# Patient Record
Sex: FEMALE | Race: WHITE | NOT HISPANIC OR LATINO | Employment: UNEMPLOYED | URBAN - METROPOLITAN AREA
[De-identification: names, ages, dates, MRNs, and addresses within clinical notes are randomized per-mention and may not be internally consistent; named-entity substitution may affect disease eponyms.]

---

## 2021-04-16 ENCOUNTER — APPOINTMENT (OUTPATIENT)
Dept: CARDIOLOGY | Facility: HOSPITAL | Age: 36
End: 2021-04-16

## 2021-04-16 ENCOUNTER — APPOINTMENT (OUTPATIENT)
Dept: ULTRASOUND IMAGING | Facility: HOSPITAL | Age: 36
End: 2021-04-16

## 2021-04-16 ENCOUNTER — APPOINTMENT (OUTPATIENT)
Dept: CT IMAGING | Facility: HOSPITAL | Age: 36
End: 2021-04-16

## 2021-04-16 ENCOUNTER — HOSPITAL ENCOUNTER (INPATIENT)
Facility: HOSPITAL | Age: 36
LOS: 3 days | Discharge: HOME | End: 2021-04-22
Attending: STUDENT IN AN ORGANIZED HEALTH CARE EDUCATION/TRAINING PROGRAM | Admitting: STUDENT IN AN ORGANIZED HEALTH CARE EDUCATION/TRAINING PROGRAM

## 2021-04-16 DIAGNOSIS — I50.811 ACUTE RIGHT-SIDED CONGESTIVE HEART FAILURE (HCC): Primary | ICD-10-CM

## 2021-04-16 DIAGNOSIS — R06.09 DYSPNEA ON EXERTION: ICD-10-CM

## 2021-04-16 DIAGNOSIS — I10 HYPERTENSION, UNSPECIFIED TYPE: ICD-10-CM

## 2021-04-16 PROBLEM — Z72.0 TOBACCO ABUSE: Chronic | Status: ACTIVE | Noted: 2021-04-16

## 2021-04-16 PROBLEM — E78.5 HLD (HYPERLIPIDEMIA): Chronic | Status: ACTIVE | Noted: 2021-04-16

## 2021-04-16 PROBLEM — D64.9 ANEMIA: Status: ACTIVE | Noted: 2021-04-16

## 2021-04-16 PROBLEM — E66.01 MORBID OBESITY (HCC): Chronic | Status: ACTIVE | Noted: 2021-04-16

## 2021-04-16 LAB
ALBUMIN SERPL-MCNC: 3.5 G/DL (ref 3.5–5.2)
ALBUMIN/GLOB SERPL: 1.3 G/DL
ALP SERPL-CCNC: 72 U/L (ref 39–117)
ALT SERPL W P-5'-P-CCNC: 21 U/L (ref 1–33)
AMMONIA BLD-SCNC: 25 UMOL/L (ref 11–51)
AMPHET+METHAMPHET UR QL: NEGATIVE
ANION GAP SERPL CALCULATED.3IONS-SCNC: 9 MMOL/L (ref 5–15)
AST SERPL-CCNC: 13 U/L (ref 1–32)
B-HCG UR QL: NEGATIVE
BARBITURATES UR QL SCN: NEGATIVE
BASOPHILS # BLD AUTO: 0.1 10*3/MM3 (ref 0–0.2)
BASOPHILS NFR BLD AUTO: 1 % (ref 0–1.5)
BENZODIAZ UR QL SCN: NEGATIVE
BILIRUB SERPL-MCNC: 0.4 MG/DL (ref 0–1.2)
BILIRUB UR QL STRIP: NEGATIVE
BUN SERPL-MCNC: 26 MG/DL (ref 6–20)
BUN/CREAT SERPL: 25 (ref 7–25)
CALCIUM SPEC-SCNC: 9 MG/DL (ref 8.6–10.5)
CANNABINOIDS SERPL QL: NEGATIVE
CHLORIDE SERPL-SCNC: 103 MMOL/L (ref 98–107)
CLARITY UR: CLEAR
CO2 SERPL-SCNC: 26 MMOL/L (ref 22–29)
COCAINE UR QL: NEGATIVE
COLOR UR: YELLOW
CREAT SERPL-MCNC: 1.04 MG/DL (ref 0.57–1)
D DIMER PPP FEU-MCNC: 3.92 MG/L (FEU) (ref 0–0.59)
DEPRECATED RDW RBC AUTO: 42.9 FL (ref 37–54)
EOSINOPHIL # BLD AUTO: 0.3 10*3/MM3 (ref 0–0.4)
EOSINOPHIL NFR BLD AUTO: 3.7 % (ref 0.3–6.2)
ERYTHROCYTE [DISTWIDTH] IN BLOOD BY AUTOMATED COUNT: 16.3 % (ref 12.3–15.4)
FERRITIN SERPL-MCNC: 86.35 NG/ML (ref 13–150)
FOLATE SERPL-MCNC: 13.7 NG/ML (ref 4.78–24.2)
GFR SERPL CREATININE-BSD FRML MDRD: 60 ML/MIN/1.73
GLOBULIN UR ELPH-MCNC: 2.7 GM/DL
GLUCOSE SERPL-MCNC: 113 MG/DL (ref 65–99)
GLUCOSE UR STRIP-MCNC: NEGATIVE MG/DL
HBA1C MFR BLD: 6.3 % (ref 3.5–5.6)
HCT VFR BLD AUTO: 33.3 % (ref 34–46.6)
HGB BLD-MCNC: 10.9 G/DL (ref 12–15.9)
HGB UR QL STRIP.AUTO: NEGATIVE
INR PPP: 1.07 (ref 0.93–1.1)
IRON 24H UR-MRATE: 25 MCG/DL (ref 37–145)
KETONES UR QL STRIP: NEGATIVE
LEUKOCYTE ESTERASE UR QL STRIP.AUTO: NEGATIVE
LYMPHOCYTES # BLD AUTO: 1.1 10*3/MM3 (ref 0.7–3.1)
LYMPHOCYTES NFR BLD AUTO: 12.4 % (ref 19.6–45.3)
MCH RBC QN AUTO: 24.2 PG (ref 26.6–33)
MCHC RBC AUTO-ENTMCNC: 32.7 G/DL (ref 31.5–35.7)
MCV RBC AUTO: 73.8 FL (ref 79–97)
METHADONE UR QL SCN: NEGATIVE
MONOCYTES # BLD AUTO: 0.7 10*3/MM3 (ref 0.1–0.9)
MONOCYTES NFR BLD AUTO: 8.1 % (ref 5–12)
NEUTROPHILS NFR BLD AUTO: 6.8 10*3/MM3 (ref 1.7–7)
NEUTROPHILS NFR BLD AUTO: 74.8 % (ref 42.7–76)
NITRITE UR QL STRIP: NEGATIVE
NRBC BLD AUTO-RTO: 0 /100 WBC (ref 0–0.2)
NT-PROBNP SERPL-MCNC: 2177 PG/ML (ref 0–450)
OPIATES UR QL: NEGATIVE
OXYCODONE UR QL SCN: NEGATIVE
PH UR STRIP.AUTO: 6 [PH] (ref 5–8)
PLATELET # BLD AUTO: 320 10*3/MM3 (ref 140–450)
PMV BLD AUTO: 8.7 FL (ref 6–12)
POTASSIUM SERPL-SCNC: 4 MMOL/L (ref 3.5–5.2)
PROT SERPL-MCNC: 6.2 G/DL (ref 6–8.5)
PROT UR QL STRIP: NEGATIVE
PROTHROMBIN TIME: 11.7 SECONDS (ref 9.6–11.7)
QT INTERVAL: 453 MS
RBC # BLD AUTO: 4.51 10*6/MM3 (ref 3.77–5.28)
SARS-COV-2 RNA PNL SPEC NAA+PROBE: NORMAL
SODIUM SERPL-SCNC: 138 MMOL/L (ref 136–145)
SP GR UR STRIP: 1.03 (ref 1–1.03)
T3FREE SERPL-MCNC: 2.5 PG/ML (ref 2–4.4)
T4 FREE SERPL-MCNC: 1.36 NG/DL (ref 0.93–1.7)
TROPONIN T SERPL-MCNC: <0.01 NG/ML (ref 0–0.03)
TSH SERPL DL<=0.05 MIU/L-ACNC: 4.73 UIU/ML (ref 0.27–4.2)
TSH SERPL DL<=0.05 MIU/L-ACNC: 5.08 UIU/ML (ref 0.27–4.2)
UROBILINOGEN UR QL STRIP: ABNORMAL
VIT B12 BLD-MCNC: 224 PG/ML (ref 211–946)
WBC # BLD AUTO: 9.1 10*3/MM3 (ref 3.4–10.8)

## 2021-04-16 PROCEDURE — 80307 DRUG TEST PRSMV CHEM ANLYZR: CPT | Performed by: NURSE PRACTITIONER

## 2021-04-16 PROCEDURE — 85610 PROTHROMBIN TIME: CPT | Performed by: NURSE PRACTITIONER

## 2021-04-16 PROCEDURE — 84443 ASSAY THYROID STIM HORMONE: CPT | Performed by: NURSE PRACTITIONER

## 2021-04-16 PROCEDURE — 80053 COMPREHEN METABOLIC PANEL: CPT | Performed by: NURSE PRACTITIONER

## 2021-04-16 PROCEDURE — 83036 HEMOGLOBIN GLYCOSYLATED A1C: CPT | Performed by: NURSE PRACTITIONER

## 2021-04-16 PROCEDURE — G0378 HOSPITAL OBSERVATION PER HR: HCPCS

## 2021-04-16 PROCEDURE — 25010000002 FUROSEMIDE PER 20 MG: Performed by: INTERNAL MEDICINE

## 2021-04-16 PROCEDURE — 93306 TTE W/DOPPLER COMPLETE: CPT

## 2021-04-16 PROCEDURE — 84484 ASSAY OF TROPONIN QUANT: CPT | Performed by: NURSE PRACTITIONER

## 2021-04-16 PROCEDURE — 81003 URINALYSIS AUTO W/O SCOPE: CPT | Performed by: INTERNAL MEDICINE

## 2021-04-16 PROCEDURE — 83880 ASSAY OF NATRIURETIC PEPTIDE: CPT | Performed by: NURSE PRACTITIONER

## 2021-04-16 PROCEDURE — 99219 PR INITIAL OBSERVATION CARE/DAY 50 MINUTES: CPT | Performed by: NURSE PRACTITIONER

## 2021-04-16 PROCEDURE — 82746 ASSAY OF FOLIC ACID SERUM: CPT | Performed by: NURSE PRACTITIONER

## 2021-04-16 PROCEDURE — 82607 VITAMIN B-12: CPT | Performed by: NURSE PRACTITIONER

## 2021-04-16 PROCEDURE — 76775 US EXAM ABDO BACK WALL LIM: CPT

## 2021-04-16 PROCEDURE — 83540 ASSAY OF IRON: CPT | Performed by: NURSE PRACTITIONER

## 2021-04-16 PROCEDURE — 93306 TTE W/DOPPLER COMPLETE: CPT | Performed by: INTERNAL MEDICINE

## 2021-04-16 PROCEDURE — 82728 ASSAY OF FERRITIN: CPT | Performed by: NURSE PRACTITIONER

## 2021-04-16 PROCEDURE — 93005 ELECTROCARDIOGRAM TRACING: CPT | Performed by: STUDENT IN AN ORGANIZED HEALTH CARE EDUCATION/TRAINING PROGRAM

## 2021-04-16 PROCEDURE — 36415 COLL VENOUS BLD VENIPUNCTURE: CPT

## 2021-04-16 PROCEDURE — 82088 ASSAY OF ALDOSTERONE: CPT | Performed by: INTERNAL MEDICINE

## 2021-04-16 PROCEDURE — 85379 FIBRIN DEGRADATION QUANT: CPT | Performed by: NURSE PRACTITIONER

## 2021-04-16 PROCEDURE — 0 IOPAMIDOL PER 1 ML: Performed by: NURSE PRACTITIONER

## 2021-04-16 PROCEDURE — 99284 EMERGENCY DEPT VISIT MOD MDM: CPT

## 2021-04-16 PROCEDURE — 81025 URINE PREGNANCY TEST: CPT | Performed by: INTERNAL MEDICINE

## 2021-04-16 PROCEDURE — 82140 ASSAY OF AMMONIA: CPT | Performed by: NURSE PRACTITIONER

## 2021-04-16 PROCEDURE — 25010000002 SULFUR HEXAFLUORIDE MICROSPH 60.7-25 MG RECONSTITUTED SUSPENSION: Performed by: STUDENT IN AN ORGANIZED HEALTH CARE EDUCATION/TRAINING PROGRAM

## 2021-04-16 PROCEDURE — 84481 FREE ASSAY (FT-3): CPT | Performed by: STUDENT IN AN ORGANIZED HEALTH CARE EDUCATION/TRAINING PROGRAM

## 2021-04-16 PROCEDURE — 84244 ASSAY OF RENIN: CPT | Performed by: INTERNAL MEDICINE

## 2021-04-16 PROCEDURE — 93005 ELECTROCARDIOGRAM TRACING: CPT

## 2021-04-16 PROCEDURE — 87635 SARS-COV-2 COVID-19 AMP PRB: CPT | Performed by: NURSE PRACTITIONER

## 2021-04-16 PROCEDURE — 84439 ASSAY OF FREE THYROXINE: CPT | Performed by: STUDENT IN AN ORGANIZED HEALTH CARE EDUCATION/TRAINING PROGRAM

## 2021-04-16 PROCEDURE — 84156 ASSAY OF PROTEIN URINE: CPT | Performed by: INTERNAL MEDICINE

## 2021-04-16 PROCEDURE — 71275 CT ANGIOGRAPHY CHEST: CPT

## 2021-04-16 PROCEDURE — 25010000002 ENOXAPARIN PER 10 MG: Performed by: NURSE PRACTITIONER

## 2021-04-16 PROCEDURE — 85025 COMPLETE CBC W/AUTO DIFF WBC: CPT | Performed by: NURSE PRACTITIONER

## 2021-04-16 PROCEDURE — 82570 ASSAY OF URINE CREATININE: CPT | Performed by: INTERNAL MEDICINE

## 2021-04-16 RX ORDER — FUROSEMIDE 10 MG/ML
40 INJECTION INTRAMUSCULAR; INTRAVENOUS EVERY 12 HOURS
Status: DISCONTINUED | OUTPATIENT
Start: 2021-04-16 | End: 2021-04-18

## 2021-04-16 RX ORDER — CHOLECALCIFEROL (VITAMIN D3) 125 MCG
10 CAPSULE ORAL NIGHTLY
COMMUNITY
End: 2021-04-28

## 2021-04-16 RX ORDER — MAGNESIUM SULFATE 1 G/100ML
1 INJECTION INTRAVENOUS AS NEEDED
Status: DISCONTINUED | OUTPATIENT
Start: 2021-04-16 | End: 2021-04-22 | Stop reason: HOSPADM

## 2021-04-16 RX ORDER — ASPIRIN 325 MG
325 TABLET ORAL DAILY
Status: DISCONTINUED | OUTPATIENT
Start: 2021-04-17 | End: 2021-04-22 | Stop reason: HOSPADM

## 2021-04-16 RX ORDER — ONDANSETRON 4 MG/1
4 TABLET, FILM COATED ORAL EVERY 6 HOURS PRN
Status: DISCONTINUED | OUTPATIENT
Start: 2021-04-16 | End: 2021-04-22 | Stop reason: HOSPADM

## 2021-04-16 RX ORDER — HYDROXYZINE HYDROCHLORIDE 25 MG/1
50 TABLET, FILM COATED ORAL 3 TIMES DAILY PRN
Status: DISCONTINUED | OUTPATIENT
Start: 2021-04-16 | End: 2021-04-22 | Stop reason: HOSPADM

## 2021-04-16 RX ORDER — LORAZEPAM 2 MG/1
2 TABLET ORAL EVERY 8 HOURS PRN
COMMUNITY
End: 2021-04-28

## 2021-04-16 RX ORDER — OMEGA-3S/DHA/EPA/FISH OIL/D3 300MG-1000
400 CAPSULE ORAL DAILY
COMMUNITY
End: 2021-05-17

## 2021-04-16 RX ORDER — POTASSIUM CHLORIDE 20 MEQ/1
40 TABLET, EXTENDED RELEASE ORAL AS NEEDED
Status: DISCONTINUED | OUTPATIENT
Start: 2021-04-16 | End: 2021-04-22 | Stop reason: HOSPADM

## 2021-04-16 RX ORDER — HYDRALAZINE HYDROCHLORIDE 25 MG/1
25 TABLET, FILM COATED ORAL EVERY 8 HOURS SCHEDULED
Status: DISCONTINUED | OUTPATIENT
Start: 2021-04-16 | End: 2021-04-17

## 2021-04-16 RX ORDER — NICOTINE 21 MG/24HR
1 PATCH, TRANSDERMAL 24 HOURS TRANSDERMAL
Status: DISCONTINUED | OUTPATIENT
Start: 2021-04-16 | End: 2021-04-18

## 2021-04-16 RX ORDER — LISINOPRIL 20 MG/1
20 TABLET ORAL DAILY
COMMUNITY
End: 2021-04-22 | Stop reason: HOSPADM

## 2021-04-16 RX ORDER — ACETAMINOPHEN 325 MG/1
650 TABLET ORAL EVERY 4 HOURS PRN
Status: DISCONTINUED | OUTPATIENT
Start: 2021-04-16 | End: 2021-04-22 | Stop reason: HOSPADM

## 2021-04-16 RX ORDER — SPIRONOLACTONE 25 MG/1
25 TABLET ORAL DAILY
Status: DISCONTINUED | OUTPATIENT
Start: 2021-04-17 | End: 2021-04-17

## 2021-04-16 RX ORDER — CARVEDILOL 6.25 MG/1
12.5 TABLET ORAL 2 TIMES DAILY WITH MEALS
Status: DISCONTINUED | OUTPATIENT
Start: 2021-04-16 | End: 2021-04-19

## 2021-04-16 RX ORDER — FUROSEMIDE 10 MG/ML
40 INJECTION INTRAMUSCULAR; INTRAVENOUS DAILY
Status: DISCONTINUED | OUTPATIENT
Start: 2021-04-16 | End: 2021-04-16

## 2021-04-16 RX ORDER — MAGNESIUM SULFATE HEPTAHYDRATE 40 MG/ML
2 INJECTION, SOLUTION INTRAVENOUS AS NEEDED
Status: DISCONTINUED | OUTPATIENT
Start: 2021-04-16 | End: 2021-04-22 | Stop reason: HOSPADM

## 2021-04-16 RX ORDER — AMLODIPINE BESYLATE 5 MG/1
5 TABLET ORAL
Status: DISCONTINUED | OUTPATIENT
Start: 2021-04-16 | End: 2021-04-20

## 2021-04-16 RX ORDER — ACETAMINOPHEN 160 MG/5ML
650 SOLUTION ORAL EVERY 4 HOURS PRN
Status: DISCONTINUED | OUTPATIENT
Start: 2021-04-16 | End: 2021-04-22 | Stop reason: HOSPADM

## 2021-04-16 RX ORDER — SODIUM CHLORIDE 0.9 % (FLUSH) 0.9 %
10 SYRINGE (ML) INJECTION EVERY 12 HOURS SCHEDULED
Status: DISCONTINUED | OUTPATIENT
Start: 2021-04-16 | End: 2021-04-22 | Stop reason: HOSPADM

## 2021-04-16 RX ORDER — ATORVASTATIN CALCIUM 20 MG/1
20 TABLET, FILM COATED ORAL NIGHTLY
Status: DISCONTINUED | OUTPATIENT
Start: 2021-04-16 | End: 2021-04-22 | Stop reason: HOSPADM

## 2021-04-16 RX ORDER — CARVEDILOL 6.25 MG/1
12.5 TABLET ORAL 2 TIMES DAILY WITH MEALS
Status: DISCONTINUED | OUTPATIENT
Start: 2021-04-16 | End: 2021-04-16 | Stop reason: SDUPTHER

## 2021-04-16 RX ORDER — SODIUM CHLORIDE 0.9 % (FLUSH) 0.9 %
10 SYRINGE (ML) INJECTION AS NEEDED
Status: DISCONTINUED | OUTPATIENT
Start: 2021-04-16 | End: 2021-04-22 | Stop reason: HOSPADM

## 2021-04-16 RX ORDER — CARVEDILOL 12.5 MG/1
12.5 TABLET ORAL 2 TIMES DAILY WITH MEALS
COMMUNITY
End: 2021-04-22 | Stop reason: HOSPADM

## 2021-04-16 RX ORDER — ATORVASTATIN CALCIUM 20 MG/1
20 TABLET, FILM COATED ORAL NIGHTLY
COMMUNITY
End: 2021-04-28

## 2021-04-16 RX ORDER — ONDANSETRON 2 MG/ML
4 INJECTION INTRAMUSCULAR; INTRAVENOUS EVERY 6 HOURS PRN
Status: DISCONTINUED | OUTPATIENT
Start: 2021-04-16 | End: 2021-04-22 | Stop reason: HOSPADM

## 2021-04-16 RX ORDER — HYDRALAZINE HYDROCHLORIDE 20 MG/ML
10 INJECTION INTRAMUSCULAR; INTRAVENOUS EVERY 6 HOURS PRN
Status: DISCONTINUED | OUTPATIENT
Start: 2021-04-16 | End: 2021-04-22 | Stop reason: HOSPADM

## 2021-04-16 RX ORDER — ASPIRIN 325 MG
325 TABLET ORAL DAILY
COMMUNITY
End: 2021-04-28

## 2021-04-16 RX ORDER — SPIRONOLACTONE 25 MG/1
25 TABLET ORAL DAILY
COMMUNITY
End: 2021-04-22 | Stop reason: HOSPADM

## 2021-04-16 RX ORDER — LABETALOL HYDROCHLORIDE 5 MG/ML
10 INJECTION, SOLUTION INTRAVENOUS
Status: DISCONTINUED | OUTPATIENT
Start: 2021-04-16 | End: 2021-04-22 | Stop reason: HOSPADM

## 2021-04-16 RX ORDER — ACETAMINOPHEN 650 MG/1
650 SUPPOSITORY RECTAL EVERY 4 HOURS PRN
Status: DISCONTINUED | OUTPATIENT
Start: 2021-04-16 | End: 2021-04-22 | Stop reason: HOSPADM

## 2021-04-16 RX ORDER — FUROSEMIDE 40 MG/1
40 TABLET ORAL DAILY
COMMUNITY
End: 2021-04-22 | Stop reason: HOSPADM

## 2021-04-16 RX ORDER — LISINOPRIL 20 MG/1
20 TABLET ORAL DAILY
Status: DISCONTINUED | OUTPATIENT
Start: 2021-04-17 | End: 2021-04-16

## 2021-04-16 RX ORDER — CLONIDINE HYDROCHLORIDE 0.1 MG/1
0.1 TABLET ORAL ONCE
Status: COMPLETED | OUTPATIENT
Start: 2021-04-16 | End: 2021-04-16

## 2021-04-16 RX ORDER — FUROSEMIDE 10 MG/ML
40 INJECTION INTRAMUSCULAR; INTRAVENOUS EVERY 12 HOURS
Status: DISCONTINUED | OUTPATIENT
Start: 2021-04-16 | End: 2021-04-16 | Stop reason: SDUPTHER

## 2021-04-16 RX ADMIN — ATORVASTATIN CALCIUM 20 MG: 20 TABLET, FILM COATED ORAL at 20:30

## 2021-04-16 RX ADMIN — FUROSEMIDE 40 MG: 10 INJECTION, SOLUTION INTRAMUSCULAR; INTRAVENOUS at 17:03

## 2021-04-16 RX ADMIN — ENOXAPARIN SODIUM 40 MG: 40 INJECTION SUBCUTANEOUS at 15:51

## 2021-04-16 RX ADMIN — CLONIDINE HYDROCHLORIDE 0.1 MG: 0.1 TABLET ORAL at 10:53

## 2021-04-16 RX ADMIN — HYDRALAZINE HYDROCHLORIDE 25 MG: 25 TABLET, FILM COATED ORAL at 15:51

## 2021-04-16 RX ADMIN — AMLODIPINE BESYLATE 5 MG: 5 TABLET ORAL at 15:50

## 2021-04-16 RX ADMIN — SULFUR HEXAFLUORIDE 1 ML: KIT at 16:41

## 2021-04-16 RX ADMIN — Medication 10 ML: at 20:30

## 2021-04-16 RX ADMIN — IOPAMIDOL 100 ML: 755 INJECTION, SOLUTION INTRAVENOUS at 12:14

## 2021-04-16 RX ADMIN — HYDROXYZINE HYDROCHLORIDE 50 MG: 25 TABLET, FILM COATED ORAL at 20:30

## 2021-04-16 RX ADMIN — HYDRALAZINE HYDROCHLORIDE 25 MG: 25 TABLET, FILM COATED ORAL at 21:42

## 2021-04-16 RX ADMIN — CARVEDILOL 12.5 MG: 6.25 TABLET, FILM COATED ORAL at 17:02

## 2021-04-17 ENCOUNTER — APPOINTMENT (OUTPATIENT)
Dept: CARDIOLOGY | Facility: HOSPITAL | Age: 36
End: 2021-04-17

## 2021-04-17 LAB
ALBUMIN SERPL-MCNC: 3.6 G/DL (ref 3.5–5.2)
ALBUMIN/GLOB SERPL: 1.2 G/DL
ALP SERPL-CCNC: 72 U/L (ref 39–117)
ALT SERPL W P-5'-P-CCNC: 20 U/L (ref 1–33)
ANION GAP SERPL CALCULATED.3IONS-SCNC: 12 MMOL/L (ref 5–15)
AST SERPL-CCNC: 14 U/L (ref 1–32)
BASOPHILS # BLD AUTO: 0.1 10*3/MM3 (ref 0–0.2)
BASOPHILS NFR BLD AUTO: 1.5 % (ref 0–1.5)
BH CV ECHO MEAS - DIST REN A PSV LEFT: 50 CM/S
BH CV ECHO MEAS - MID REN A PSV LEFT: 65 CM/S
BH CV ECHO MEAS - PROX REN A PSV LEFT: 61 CM/S
BH CV VAS RENAL AORTIC MID PSV: 90 CM/S
BH CV XLRA MEAS - KID L LEFT: 11.7 CM
BH CV XLRA MEAS DIST REN A PSV RIGHT: 71 CM/S
BH CV XLRA MEAS KID L RIGHT: 11.7 CM
BH CV XLRA MEAS KID W RIGHT: 5.8 CM
BH CV XLRA MEAS MID REN A PSV RIGHT: 58 CM/S
BH CV XLRA MEAS PROX REN A PSV RIGHT: 58 CM/S
BH CV XLRA MEAS RAR LEFT: 0.72
BH CV XLRA MEAS RAR RIGHT: 0.79
BILIRUB SERPL-MCNC: 0.4 MG/DL (ref 0–1.2)
BUN SERPL-MCNC: 19 MG/DL (ref 6–20)
BUN/CREAT SERPL: 19 (ref 7–25)
CALCIUM SPEC-SCNC: 9.3 MG/DL (ref 8.6–10.5)
CHLORIDE SERPL-SCNC: 99 MMOL/L (ref 98–107)
CHOLEST SERPL-MCNC: 111 MG/DL (ref 0–200)
CK SERPL-CCNC: 36 U/L (ref 20–180)
CO2 SERPL-SCNC: 29 MMOL/L (ref 22–29)
CREAT SERPL-MCNC: 1 MG/DL (ref 0.57–1)
CREAT UR-MCNC: 62.1 MG/DL
DEPRECATED RDW RBC AUTO: 43.8 FL (ref 37–54)
EOSINOPHIL # BLD AUTO: 0.4 10*3/MM3 (ref 0–0.4)
EOSINOPHIL NFR BLD AUTO: 4 % (ref 0.3–6.2)
ERYTHROCYTE [DISTWIDTH] IN BLOOD BY AUTOMATED COUNT: 16.6 % (ref 12.3–15.4)
FERRITIN SERPL-MCNC: 349.1 NG/ML (ref 13–150)
GFR SERPL CREATININE-BSD FRML MDRD: 63 ML/MIN/1.73
GLOBULIN UR ELPH-MCNC: 2.9 GM/DL
GLUCOSE SERPL-MCNC: 146 MG/DL (ref 65–99)
HCT VFR BLD AUTO: 35.2 % (ref 34–46.6)
HDLC SERPL-MCNC: 24 MG/DL (ref 40–60)
HGB BLD-MCNC: 11.6 G/DL (ref 12–15.9)
IRON 24H UR-MRATE: 24 MCG/DL (ref 37–145)
IRON SATN MFR SERPL: 6 % (ref 20–50)
LDLC SERPL CALC-MCNC: 67 MG/DL (ref 0–100)
LDLC/HDLC SERPL: 2.72 {RATIO}
LEFT KIDNEY WIDTH: 5 CM
LYMPHOCYTES # BLD AUTO: 1.2 10*3/MM3 (ref 0.7–3.1)
LYMPHOCYTES NFR BLD AUTO: 12 % (ref 19.6–45.3)
MAGNESIUM SERPL-MCNC: 1.8 MG/DL (ref 1.6–2.6)
MCH RBC QN AUTO: 24.2 PG (ref 26.6–33)
MCHC RBC AUTO-ENTMCNC: 32.9 G/DL (ref 31.5–35.7)
MCV RBC AUTO: 73.4 FL (ref 79–97)
MONOCYTES # BLD AUTO: 0.8 10*3/MM3 (ref 0.1–0.9)
MONOCYTES NFR BLD AUTO: 8.6 % (ref 5–12)
NEUTROPHILS NFR BLD AUTO: 7.3 10*3/MM3 (ref 1.7–7)
NEUTROPHILS NFR BLD AUTO: 73.9 % (ref 42.7–76)
NRBC BLD AUTO-RTO: 0 /100 WBC (ref 0–0.2)
NT-PROBNP SERPL-MCNC: 1670 PG/ML (ref 0–450)
PHOSPHATE SERPL-MCNC: 2.7 MG/DL (ref 2.5–4.5)
PLATELET # BLD AUTO: 322 10*3/MM3 (ref 140–450)
PMV BLD AUTO: 8.9 FL (ref 6–12)
POTASSIUM SERPL-SCNC: 3.3 MMOL/L (ref 3.5–5.2)
PROT SERPL-MCNC: 6.5 G/DL (ref 6–8.5)
PROT UR-MCNC: 12 MG/DL
PROT/CREAT UR: 193.2 MG/G CREA (ref 0–200)
RBC # BLD AUTO: 4.79 10*6/MM3 (ref 3.77–5.28)
SODIUM SERPL-SCNC: 140 MMOL/L (ref 136–145)
TIBC SERPL-MCNC: 398 MCG/DL (ref 298–536)
TRANSFERRIN SERPL-MCNC: 267 MG/DL (ref 200–360)
TRIGL SERPL-MCNC: 109 MG/DL (ref 0–150)
TROPONIN T SERPL-MCNC: <0.01 NG/ML (ref 0–0.03)
URATE SERPL-MCNC: 8.7 MG/DL (ref 2.4–5.7)
VLDLC SERPL-MCNC: 20 MG/DL (ref 5–40)
WBC # BLD AUTO: 9.8 10*3/MM3 (ref 3.4–10.8)

## 2021-04-17 PROCEDURE — 84550 ASSAY OF BLOOD/URIC ACID: CPT | Performed by: INTERNAL MEDICINE

## 2021-04-17 PROCEDURE — 93975 VASCULAR STUDY: CPT

## 2021-04-17 PROCEDURE — 99225 PR SBSQ OBSERVATION CARE/DAY 25 MINUTES: CPT | Performed by: STUDENT IN AN ORGANIZED HEALTH CARE EDUCATION/TRAINING PROGRAM

## 2021-04-17 PROCEDURE — 25010000002 FUROSEMIDE PER 20 MG: Performed by: INTERNAL MEDICINE

## 2021-04-17 PROCEDURE — 82088 ASSAY OF ALDOSTERONE: CPT | Performed by: INTERNAL MEDICINE

## 2021-04-17 PROCEDURE — 99204 OFFICE O/P NEW MOD 45 MIN: CPT | Performed by: INTERNAL MEDICINE

## 2021-04-17 PROCEDURE — 82550 ASSAY OF CK (CPK): CPT | Performed by: INTERNAL MEDICINE

## 2021-04-17 PROCEDURE — 84100 ASSAY OF PHOSPHORUS: CPT | Performed by: INTERNAL MEDICINE

## 2021-04-17 PROCEDURE — 83735 ASSAY OF MAGNESIUM: CPT | Performed by: NURSE PRACTITIONER

## 2021-04-17 PROCEDURE — 82728 ASSAY OF FERRITIN: CPT | Performed by: NURSE PRACTITIONER

## 2021-04-17 PROCEDURE — 84466 ASSAY OF TRANSFERRIN: CPT | Performed by: NURSE PRACTITIONER

## 2021-04-17 PROCEDURE — 80053 COMPREHEN METABOLIC PANEL: CPT | Performed by: INTERNAL MEDICINE

## 2021-04-17 PROCEDURE — 80061 LIPID PANEL: CPT | Performed by: NURSE PRACTITIONER

## 2021-04-17 PROCEDURE — 25010000002 ENOXAPARIN PER 10 MG: Performed by: NURSE PRACTITIONER

## 2021-04-17 PROCEDURE — 85025 COMPLETE CBC W/AUTO DIFF WBC: CPT | Performed by: NURSE PRACTITIONER

## 2021-04-17 PROCEDURE — G0378 HOSPITAL OBSERVATION PER HR: HCPCS

## 2021-04-17 PROCEDURE — 83880 ASSAY OF NATRIURETIC PEPTIDE: CPT | Performed by: NURSE PRACTITIONER

## 2021-04-17 PROCEDURE — 83540 ASSAY OF IRON: CPT | Performed by: NURSE PRACTITIONER

## 2021-04-17 RX ORDER — HYDRALAZINE HYDROCHLORIDE 25 MG/1
50 TABLET, FILM COATED ORAL EVERY 8 HOURS SCHEDULED
Status: DISCONTINUED | OUTPATIENT
Start: 2021-04-18 | End: 2021-04-21

## 2021-04-17 RX ORDER — SPIRONOLACTONE 25 MG/1
25 TABLET ORAL 3 TIMES DAILY
Status: DISCONTINUED | OUTPATIENT
Start: 2021-04-17 | End: 2021-04-17

## 2021-04-17 RX ORDER — SPIRONOLACTONE 25 MG/1
50 TABLET ORAL 3 TIMES DAILY
Status: DISCONTINUED | OUTPATIENT
Start: 2021-04-18 | End: 2021-04-20

## 2021-04-17 RX ADMIN — FUROSEMIDE 40 MG: 10 INJECTION, SOLUTION INTRAMUSCULAR; INTRAVENOUS at 05:16

## 2021-04-17 RX ADMIN — SPIRONOLACTONE 25 MG: 25 TABLET ORAL at 15:46

## 2021-04-17 RX ADMIN — CARVEDILOL 12.5 MG: 6.25 TABLET, FILM COATED ORAL at 08:41

## 2021-04-17 RX ADMIN — POTASSIUM CHLORIDE 40 MEQ: 1500 TABLET, EXTENDED RELEASE ORAL at 21:07

## 2021-04-17 RX ADMIN — ASPIRIN 325 MG ORAL TABLET 325 MG: 325 PILL ORAL at 08:41

## 2021-04-17 RX ADMIN — SPIRONOLACTONE 25 MG: 25 TABLET ORAL at 08:41

## 2021-04-17 RX ADMIN — POTASSIUM CHLORIDE 40 MEQ: 1500 TABLET, EXTENDED RELEASE ORAL at 15:46

## 2021-04-17 RX ADMIN — HYDROXYZINE HYDROCHLORIDE 50 MG: 25 TABLET, FILM COATED ORAL at 22:44

## 2021-04-17 RX ADMIN — ATORVASTATIN CALCIUM 20 MG: 20 TABLET, FILM COATED ORAL at 21:07

## 2021-04-17 RX ADMIN — CARVEDILOL 12.5 MG: 6.25 TABLET, FILM COATED ORAL at 18:07

## 2021-04-17 RX ADMIN — Medication 10 ML: at 21:08

## 2021-04-17 RX ADMIN — LABETALOL 20 MG/4 ML (5 MG/ML) INTRAVENOUS SYRINGE 10 MG: at 23:25

## 2021-04-17 RX ADMIN — FUROSEMIDE 40 MG: 10 INJECTION, SOLUTION INTRAMUSCULAR; INTRAVENOUS at 18:07

## 2021-04-17 RX ADMIN — SPIRONOLACTONE 25 MG: 25 TABLET ORAL at 21:07

## 2021-04-17 RX ADMIN — HYDRALAZINE HYDROCHLORIDE 25 MG: 25 TABLET, FILM COATED ORAL at 15:46

## 2021-04-17 RX ADMIN — HYDRALAZINE HYDROCHLORIDE 25 MG: 25 TABLET, FILM COATED ORAL at 21:07

## 2021-04-17 RX ADMIN — Medication 1 PATCH: at 08:33

## 2021-04-17 RX ADMIN — AMLODIPINE BESYLATE 5 MG: 5 TABLET ORAL at 08:41

## 2021-04-17 RX ADMIN — HYDRALAZINE HYDROCHLORIDE 25 MG: 25 TABLET, FILM COATED ORAL at 05:16

## 2021-04-17 RX ADMIN — Medication 10 ML: at 08:41

## 2021-04-17 RX ADMIN — ENOXAPARIN SODIUM 40 MG: 40 INJECTION SUBCUTANEOUS at 15:48

## 2021-04-18 LAB
ALBUMIN SERPL-MCNC: 3.3 G/DL (ref 3.5–5.2)
ANION GAP SERPL CALCULATED.3IONS-SCNC: 10 MMOL/L (ref 5–15)
BASOPHILS # BLD AUTO: 0 10*3/MM3 (ref 0–0.2)
BASOPHILS NFR BLD AUTO: 0.3 % (ref 0–1.5)
BUN SERPL-MCNC: 18 MG/DL (ref 6–20)
BUN/CREAT SERPL: 16.5 (ref 7–25)
CALCIUM SPEC-SCNC: 9.2 MG/DL (ref 8.6–10.5)
CHLORIDE SERPL-SCNC: 101 MMOL/L (ref 98–107)
CO2 SERPL-SCNC: 27 MMOL/L (ref 22–29)
CREAT SERPL-MCNC: 1.09 MG/DL (ref 0.57–1)
DEPRECATED RDW RBC AUTO: 40.3 FL (ref 37–54)
EOSINOPHIL # BLD AUTO: 0.5 10*3/MM3 (ref 0–0.4)
EOSINOPHIL NFR BLD AUTO: 5 % (ref 0.3–6.2)
ERYTHROCYTE [DISTWIDTH] IN BLOOD BY AUTOMATED COUNT: 15.8 % (ref 12.3–15.4)
GFR SERPL CREATININE-BSD FRML MDRD: 57 ML/MIN/1.73
GLUCOSE SERPL-MCNC: 100 MG/DL (ref 65–99)
HCT VFR BLD AUTO: 34.6 % (ref 34–46.6)
HGB BLD-MCNC: 11 G/DL (ref 12–15.9)
LYMPHOCYTES # BLD AUTO: 1.9 10*3/MM3 (ref 0.7–3.1)
LYMPHOCYTES NFR BLD AUTO: 20.8 % (ref 19.6–45.3)
MAGNESIUM SERPL-MCNC: 1.9 MG/DL (ref 1.6–2.6)
MCH RBC QN AUTO: 23.6 PG (ref 26.6–33)
MCHC RBC AUTO-ENTMCNC: 31.9 G/DL (ref 31.5–35.7)
MCV RBC AUTO: 74 FL (ref 79–97)
MONOCYTES # BLD AUTO: 1.2 10*3/MM3 (ref 0.1–0.9)
MONOCYTES NFR BLD AUTO: 12.5 % (ref 5–12)
NEUTROPHILS NFR BLD AUTO: 5.7 10*3/MM3 (ref 1.7–7)
NEUTROPHILS NFR BLD AUTO: 61.4 % (ref 42.7–76)
NRBC BLD AUTO-RTO: 0 /100 WBC (ref 0–0.2)
PHOSPHATE SERPL-MCNC: 4.4 MG/DL (ref 2.5–4.5)
PLATELET # BLD AUTO: 303 10*3/MM3 (ref 140–450)
PMV BLD AUTO: 9.1 FL (ref 6–12)
POTASSIUM SERPL-SCNC: 4.5 MMOL/L (ref 3.5–5.2)
RBC # BLD AUTO: 4.67 10*6/MM3 (ref 3.77–5.28)
SODIUM SERPL-SCNC: 138 MMOL/L (ref 136–145)
WBC # BLD AUTO: 9.3 10*3/MM3 (ref 3.4–10.8)

## 2021-04-18 PROCEDURE — 99225 PR SBSQ OBSERVATION CARE/DAY 25 MINUTES: CPT | Performed by: STUDENT IN AN ORGANIZED HEALTH CARE EDUCATION/TRAINING PROGRAM

## 2021-04-18 PROCEDURE — 85025 COMPLETE CBC W/AUTO DIFF WBC: CPT | Performed by: NURSE PRACTITIONER

## 2021-04-18 PROCEDURE — 80069 RENAL FUNCTION PANEL: CPT | Performed by: INTERNAL MEDICINE

## 2021-04-18 PROCEDURE — 25010000002 ENOXAPARIN PER 10 MG: Performed by: NURSE PRACTITIONER

## 2021-04-18 PROCEDURE — 99214 OFFICE O/P EST MOD 30 MIN: CPT | Performed by: INTERNAL MEDICINE

## 2021-04-18 PROCEDURE — 83735 ASSAY OF MAGNESIUM: CPT | Performed by: NURSE PRACTITIONER

## 2021-04-18 PROCEDURE — G0378 HOSPITAL OBSERVATION PER HR: HCPCS

## 2021-04-18 PROCEDURE — 25010000002 FUROSEMIDE PER 20 MG: Performed by: INTERNAL MEDICINE

## 2021-04-18 RX ORDER — FUROSEMIDE 40 MG/1
40 TABLET ORAL
Status: DISCONTINUED | OUTPATIENT
Start: 2021-04-19 | End: 2021-04-21

## 2021-04-18 RX ORDER — FUROSEMIDE 40 MG/1
40 TABLET ORAL
Status: DISCONTINUED | OUTPATIENT
Start: 2021-04-18 | End: 2021-04-18

## 2021-04-18 RX ORDER — SODIUM CHLORIDE 9 MG/ML
100 INJECTION, SOLUTION INTRAVENOUS CONTINUOUS
Status: DISCONTINUED | OUTPATIENT
Start: 2021-04-19 | End: 2021-04-19 | Stop reason: HOSPADM

## 2021-04-18 RX ADMIN — SPIRONOLACTONE 50 MG: 25 TABLET ORAL at 09:13

## 2021-04-18 RX ADMIN — HYDRALAZINE HYDROCHLORIDE 50 MG: 25 TABLET, FILM COATED ORAL at 05:36

## 2021-04-18 RX ADMIN — AMLODIPINE BESYLATE 5 MG: 5 TABLET ORAL at 09:13

## 2021-04-18 RX ADMIN — CARVEDILOL 12.5 MG: 6.25 TABLET, FILM COATED ORAL at 17:45

## 2021-04-18 RX ADMIN — NICOTINE 1 PATCH: 7 PATCH, EXTENDED RELEASE TRANSDERMAL at 14:43

## 2021-04-18 RX ADMIN — FUROSEMIDE 40 MG: 10 INJECTION, SOLUTION INTRAMUSCULAR; INTRAVENOUS at 05:36

## 2021-04-18 RX ADMIN — CARVEDILOL 12.5 MG: 6.25 TABLET, FILM COATED ORAL at 09:13

## 2021-04-18 RX ADMIN — Medication 600 MG: at 21:31

## 2021-04-18 RX ADMIN — SPIRONOLACTONE 50 MG: 25 TABLET ORAL at 17:45

## 2021-04-18 RX ADMIN — HYDROXYZINE HYDROCHLORIDE 50 MG: 25 TABLET, FILM COATED ORAL at 19:07

## 2021-04-18 RX ADMIN — Medication 10 ML: at 21:31

## 2021-04-18 RX ADMIN — SPIRONOLACTONE 50 MG: 25 TABLET ORAL at 21:31

## 2021-04-18 RX ADMIN — ASPIRIN 325 MG ORAL TABLET 325 MG: 325 PILL ORAL at 09:13

## 2021-04-18 RX ADMIN — HYDRALAZINE HYDROCHLORIDE 50 MG: 25 TABLET, FILM COATED ORAL at 21:31

## 2021-04-18 RX ADMIN — ENOXAPARIN SODIUM 40 MG: 40 INJECTION SUBCUTANEOUS at 17:45

## 2021-04-18 RX ADMIN — Medication 10 ML: at 09:14

## 2021-04-18 RX ADMIN — LABETALOL 20 MG/4 ML (5 MG/ML) INTRAVENOUS SYRINGE 10 MG: at 06:25

## 2021-04-18 RX ADMIN — Medication 600 MG: at 12:37

## 2021-04-18 RX ADMIN — ATORVASTATIN CALCIUM 20 MG: 20 TABLET, FILM COATED ORAL at 21:31

## 2021-04-18 RX ADMIN — HYDRALAZINE HYDROCHLORIDE 50 MG: 25 TABLET, FILM COATED ORAL at 14:43

## 2021-04-19 LAB
ALBUMIN SERPL-MCNC: 4.1 G/DL (ref 3.5–5.2)
ANION GAP SERPL CALCULATED.3IONS-SCNC: 11 MMOL/L (ref 5–15)
BASOPHILS # BLD AUTO: 0.1 10*3/MM3 (ref 0–0.2)
BASOPHILS NFR BLD AUTO: 0.8 % (ref 0–1.5)
BH CV ECHO MEAS - ACS: 2 CM
BH CV ECHO MEAS - AO MAX PG (FULL): 1.9 MMHG
BH CV ECHO MEAS - AO MAX PG: 6.4 MMHG
BH CV ECHO MEAS - AO MEAN PG (FULL): 2.6 MMHG
BH CV ECHO MEAS - AO MEAN PG: 4.5 MMHG
BH CV ECHO MEAS - AO ROOT AREA (BSA CORRECTED): 1.4
BH CV ECHO MEAS - AO ROOT AREA: 7.5 CM^2
BH CV ECHO MEAS - AO ROOT DIAM: 3.1 CM
BH CV ECHO MEAS - AO V2 MAX: 126.4 CM/SEC
BH CV ECHO MEAS - AO V2 MEAN: 104.8 CM/SEC
BH CV ECHO MEAS - AO V2 VTI: 20.7 CM
BH CV ECHO MEAS - AORTIC HR: 72.6 BPM
BH CV ECHO MEAS - AORTIC R-R: 0.83 SEC
BH CV ECHO MEAS - ASC AORTA: 3.5 CM
BH CV ECHO MEAS - AVA(I,A): 2.7 CM^2
BH CV ECHO MEAS - AVA(I,D): 2.7 CM^2
BH CV ECHO MEAS - AVA(V,A): 2.9 CM^2
BH CV ECHO MEAS - AVA(V,D): 2.9 CM^2
BH CV ECHO MEAS - BSA(HAYCOCK): 2.3 M^2
BH CV ECHO MEAS - BSA: 2.2 M^2
BH CV ECHO MEAS - BZI_BMI: 40.9 KILOGRAMS/M^2
BH CV ECHO MEAS - BZI_METRIC_HEIGHT: 165.1 CM
BH CV ECHO MEAS - BZI_METRIC_WEIGHT: 111.6 KG
BH CV ECHO MEAS - CI(AO): 5.2 L/MIN/M^2
BH CV ECHO MEAS - CI(LVOT): 1.9 L/MIN/M^2
BH CV ECHO MEAS - CO(AO): 11.3 L/MIN
BH CV ECHO MEAS - CO(LVOT): 4 L/MIN
BH CV ECHO MEAS - EDV(CUBED): 175 ML
BH CV ECHO MEAS - EDV(MOD-SP2): 185.3 ML
BH CV ECHO MEAS - EDV(MOD-SP4): 189.5 ML
BH CV ECHO MEAS - EDV(TEICH): 153.3 ML
BH CV ECHO MEAS - EF(CUBED): 27.7 %
BH CV ECHO MEAS - EF(MOD-BP): 15 %
BH CV ECHO MEAS - EF(MOD-SP2): 21.2 %
BH CV ECHO MEAS - EF(MOD-SP4): 7.2 %
BH CV ECHO MEAS - EF(TEICH): 22.1 %
BH CV ECHO MEAS - ESV(CUBED): 126.6 ML
BH CV ECHO MEAS - ESV(MOD-SP2): 146 ML
BH CV ECHO MEAS - ESV(MOD-SP4): 175.8 ML
BH CV ECHO MEAS - ESV(TEICH): 119.4 ML
BH CV ECHO MEAS - FS: 10.2 %
BH CV ECHO MEAS - IVS/LVPW: 0.77
BH CV ECHO MEAS - IVSD: 1.7 CM
BH CV ECHO MEAS - LA DIMENSION(2D): 4.5 CM
BH CV ECHO MEAS - LV DIASTOLIC VOL/BSA (35-75): 87.7 ML/M^2
BH CV ECHO MEAS - LV MASS(C)D: 571.1 GRAMS
BH CV ECHO MEAS - LV MASS(C)DI: 264.4 GRAMS/M^2
BH CV ECHO MEAS - LV MAX PG: 4.5 MMHG
BH CV ECHO MEAS - LV MEAN PG: 2 MMHG
BH CV ECHO MEAS - LV SYSTOLIC VOL/BSA (12-30): 81.4 ML/M^2
BH CV ECHO MEAS - LV V1 MAX: 105.9 CM/SEC
BH CV ECHO MEAS - LV V1 MEAN: 62.2 CM/SEC
BH CV ECHO MEAS - LV V1 VTI: 15.9 CM
BH CV ECHO MEAS - LVIDD: 5.6 CM
BH CV ECHO MEAS - LVIDS: 5 CM
BH CV ECHO MEAS - LVOT AREA: 3.5 CM^2
BH CV ECHO MEAS - LVOT DIAM: 2.1 CM
BH CV ECHO MEAS - LVPWD: 2.2 CM
BH CV ECHO MEAS - MR MAX PG: 62.3 MMHG
BH CV ECHO MEAS - MR MAX VEL: 394.6 CM/SEC
BH CV ECHO MEAS - MV A MAX VEL: 31.8 CM/SEC
BH CV ECHO MEAS - MV DEC SLOPE: 794.8 CM/SEC^2
BH CV ECHO MEAS - MV DEC TIME: 0.16 SEC
BH CV ECHO MEAS - MV E MAX VEL: 126.6 CM/SEC
BH CV ECHO MEAS - MV E/A: 4
BH CV ECHO MEAS - MV MAX PG: 6.3 MMHG
BH CV ECHO MEAS - MV MEAN PG: 2.6 MMHG
BH CV ECHO MEAS - MV V2 MAX: 125.9 CM/SEC
BH CV ECHO MEAS - MV V2 MEAN: 74.6 CM/SEC
BH CV ECHO MEAS - MV V2 VTI: 30.7 CM
BH CV ECHO MEAS - MVA(VTI): 1.8 CM^2
BH CV ECHO MEAS - PA ACC TIME: 0.14 SEC
BH CV ECHO MEAS - PA MAX PG (FULL): 2 MMHG
BH CV ECHO MEAS - PA MAX PG: 3.4 MMHG
BH CV ECHO MEAS - PA MEAN PG (FULL): 2.1 MMHG
BH CV ECHO MEAS - PA MEAN PG: 2.9 MMHG
BH CV ECHO MEAS - PA PR(ACCEL): 15.6 MMHG
BH CV ECHO MEAS - PA V2 MAX: 91.7 CM/SEC
BH CV ECHO MEAS - PA V2 MEAN: 83.8 CM/SEC
BH CV ECHO MEAS - PA V2 VTI: 22.6 CM
BH CV ECHO MEAS - PI END-D VEL: 169.4 CM/SEC
BH CV ECHO MEAS - PI MAX PG: 23.4 MMHG
BH CV ECHO MEAS - PI MAX VEL: 242 CM/SEC
BH CV ECHO MEAS - PULM DIAS VEL: 44.3 CM/SEC
BH CV ECHO MEAS - PULM S/D: 0.85
BH CV ECHO MEAS - PULM SYS VEL: 37.8 CM/SEC
BH CV ECHO MEAS - PVA(I,A): 4.6 CM^2
BH CV ECHO MEAS - PVA(I,D): 4.6 CM^2
BH CV ECHO MEAS - PVA(V,A): 6 CM^2
BH CV ECHO MEAS - PVA(V,D): 6 CM^2
BH CV ECHO MEAS - QP/QS: 1.9
BH CV ECHO MEAS - RAP SYSTOLE: 3 MMHG
BH CV ECHO MEAS - RV MAX PG: 1.4 MMHG
BH CV ECHO MEAS - RV MEAN PG: 0.82 MMHG
BH CV ECHO MEAS - RV V1 MAX: 58.6 CM/SEC
BH CV ECHO MEAS - RV V1 MEAN: 42.3 CM/SEC
BH CV ECHO MEAS - RV V1 VTI: 11.1 CM
BH CV ECHO MEAS - RVDD: 2.6 CM
BH CV ECHO MEAS - RVOT AREA: 9.3 CM^2
BH CV ECHO MEAS - RVOT DIAM: 3.4 CM
BH CV ECHO MEAS - RVSP: 25.4 MMHG
BH CV ECHO MEAS - SI(AO): 72 ML/M^2
BH CV ECHO MEAS - SI(CUBED): 22.4 ML/M^2
BH CV ECHO MEAS - SI(LVOT): 25.8 ML/M^2
BH CV ECHO MEAS - SI(MOD-SP2): 18.2 ML/M^2
BH CV ECHO MEAS - SI(MOD-SP4): 6.3 ML/M^2
BH CV ECHO MEAS - SI(TEICH): 15.7 ML/M^2
BH CV ECHO MEAS - SV(AO): 155.5 ML
BH CV ECHO MEAS - SV(CUBED): 48.4 ML
BH CV ECHO MEAS - SV(LVOT): 55.7 ML
BH CV ECHO MEAS - SV(MOD-SP2): 39.3 ML
BH CV ECHO MEAS - SV(MOD-SP4): 13.6 ML
BH CV ECHO MEAS - SV(RVOT): 103.6 ML
BH CV ECHO MEAS - SV(TEICH): 33.9 ML
BH CV ECHO MEAS - TR MAX VEL: 233.5 CM/SEC
BUN SERPL-MCNC: 12 MG/DL (ref 6–20)
BUN/CREAT SERPL: 12.8 (ref 7–25)
CALCIUM SPEC-SCNC: 9.8 MG/DL (ref 8.6–10.5)
CHLORIDE SERPL-SCNC: 101 MMOL/L (ref 98–107)
CO2 SERPL-SCNC: 26 MMOL/L (ref 22–29)
CREAT SERPL-MCNC: 0.94 MG/DL (ref 0.57–1)
DEPRECATED RDW RBC AUTO: 43.8 FL (ref 37–54)
DEPRECATED RDW RBC AUTO: 45.1 FL (ref 37–54)
EOSINOPHIL # BLD AUTO: 0.5 10*3/MM3 (ref 0–0.4)
EOSINOPHIL NFR BLD AUTO: 5.1 % (ref 0.3–6.2)
ERYTHROCYTE [DISTWIDTH] IN BLOOD BY AUTOMATED COUNT: 16.5 % (ref 12.3–15.4)
ERYTHROCYTE [DISTWIDTH] IN BLOOD BY AUTOMATED COUNT: 16.7 % (ref 12.3–15.4)
GFR SERPL CREATININE-BSD FRML MDRD: 67 ML/MIN/1.73
GLUCOSE SERPL-MCNC: 111 MG/DL (ref 65–99)
HCT VFR BLD AUTO: 38.7 % (ref 34–46.6)
HCT VFR BLD AUTO: 40.9 % (ref 34–46.6)
HGB BLD-MCNC: 12.3 G/DL (ref 12–15.9)
HGB BLD-MCNC: 12.9 G/DL (ref 12–15.9)
INR PPP: 1.01 (ref 0.93–1.1)
LV EF 2D ECHO EST: 20 %
LYMPHOCYTES # BLD AUTO: 2 10*3/MM3 (ref 0.7–3.1)
LYMPHOCYTES NFR BLD AUTO: 22.3 % (ref 19.6–45.3)
MAGNESIUM SERPL-MCNC: 1.9 MG/DL (ref 1.6–2.6)
MCH RBC QN AUTO: 23.6 PG (ref 26.6–33)
MCH RBC QN AUTO: 24 PG (ref 26.6–33)
MCHC RBC AUTO-ENTMCNC: 31.6 G/DL (ref 31.5–35.7)
MCHC RBC AUTO-ENTMCNC: 31.7 G/DL (ref 31.5–35.7)
MCV RBC AUTO: 74.6 FL (ref 79–97)
MCV RBC AUTO: 75.8 FL (ref 79–97)
MONOCYTES # BLD AUTO: 0.8 10*3/MM3 (ref 0.1–0.9)
MONOCYTES NFR BLD AUTO: 9.2 % (ref 5–12)
NEUTROPHILS NFR BLD AUTO: 5.6 10*3/MM3 (ref 1.7–7)
NEUTROPHILS NFR BLD AUTO: 62.6 % (ref 42.7–76)
NRBC BLD AUTO-RTO: 0.1 /100 WBC (ref 0–0.2)
PHOSPHATE SERPL-MCNC: 3.2 MG/DL (ref 2.5–4.5)
PLATELET # BLD AUTO: 213 10*3/MM3 (ref 140–450)
PLATELET # BLD AUTO: 367 10*3/MM3 (ref 140–450)
PMV BLD AUTO: 9 FL (ref 6–12)
PMV BLD AUTO: 9.1 FL (ref 6–12)
POTASSIUM SERPL-SCNC: 4.8 MMOL/L (ref 3.5–5.2)
PROTHROMBIN TIME: 11.1 SECONDS (ref 9.6–11.7)
RBC # BLD AUTO: 5.1 10*6/MM3 (ref 3.77–5.28)
RBC # BLD AUTO: 5.48 10*6/MM3 (ref 3.77–5.28)
SODIUM SERPL-SCNC: 138 MMOL/L (ref 136–145)
WBC # BLD AUTO: 8.9 10*3/MM3 (ref 3.4–10.8)
WBC # BLD AUTO: 9 10*3/MM3 (ref 3.4–10.8)

## 2021-04-19 PROCEDURE — 99152 MOD SED SAME PHYS/QHP 5/>YRS: CPT | Performed by: INTERNAL MEDICINE

## 2021-04-19 PROCEDURE — 25010000002 HYDRALAZINE PER 20 MG: Performed by: NURSE PRACTITIONER

## 2021-04-19 PROCEDURE — C1894 INTRO/SHEATH, NON-LASER: HCPCS | Performed by: INTERNAL MEDICINE

## 2021-04-19 PROCEDURE — 99153 MOD SED SAME PHYS/QHP EA: CPT | Performed by: INTERNAL MEDICINE

## 2021-04-19 PROCEDURE — 83735 ASSAY OF MAGNESIUM: CPT | Performed by: NURSE PRACTITIONER

## 2021-04-19 PROCEDURE — 85025 COMPLETE CBC W/AUTO DIFF WBC: CPT | Performed by: NURSE PRACTITIONER

## 2021-04-19 PROCEDURE — 99232 SBSQ HOSP IP/OBS MODERATE 35: CPT | Performed by: INTERNAL MEDICINE

## 2021-04-19 PROCEDURE — B2151ZZ FLUOROSCOPY OF LEFT HEART USING LOW OSMOLAR CONTRAST: ICD-10-PCS | Performed by: INTERNAL MEDICINE

## 2021-04-19 PROCEDURE — B2111ZZ FLUOROSCOPY OF MULTIPLE CORONARY ARTERIES USING LOW OSMOLAR CONTRAST: ICD-10-PCS | Performed by: INTERNAL MEDICINE

## 2021-04-19 PROCEDURE — 25010000002 MIDAZOLAM PER 1 MG: Performed by: INTERNAL MEDICINE

## 2021-04-19 PROCEDURE — 99233 SBSQ HOSP IP/OBS HIGH 50: CPT | Performed by: INTERNAL MEDICINE

## 2021-04-19 PROCEDURE — 80069 RENAL FUNCTION PANEL: CPT | Performed by: INTERNAL MEDICINE

## 2021-04-19 PROCEDURE — C1769 GUIDE WIRE: HCPCS | Performed by: INTERNAL MEDICINE

## 2021-04-19 PROCEDURE — 85027 COMPLETE CBC AUTOMATED: CPT | Performed by: NURSE PRACTITIONER

## 2021-04-19 PROCEDURE — 25010000002 HEPARIN (PORCINE) PER 1000 UNITS: Performed by: INTERNAL MEDICINE

## 2021-04-19 PROCEDURE — 25010000002 FENTANYL CITRATE (PF) 100 MCG/2ML SOLUTION: Performed by: INTERNAL MEDICINE

## 2021-04-19 PROCEDURE — 85610 PROTHROMBIN TIME: CPT | Performed by: NURSE PRACTITIONER

## 2021-04-19 PROCEDURE — 93458 L HRT ARTERY/VENTRICLE ANGIO: CPT | Performed by: INTERNAL MEDICINE

## 2021-04-19 PROCEDURE — 0 IOPAMIDOL PER 1 ML: Performed by: INTERNAL MEDICINE

## 2021-04-19 PROCEDURE — 4A023N7 MEASUREMENT OF CARDIAC SAMPLING AND PRESSURE, LEFT HEART, PERCUTANEOUS APPROACH: ICD-10-PCS | Performed by: INTERNAL MEDICINE

## 2021-04-19 RX ORDER — NITROGLYCERIN 5 MG/ML
INJECTION, SOLUTION INTRAVENOUS AS NEEDED
Status: DISCONTINUED | OUTPATIENT
Start: 2021-04-19 | End: 2021-04-19 | Stop reason: HOSPADM

## 2021-04-19 RX ORDER — LIDOCAINE HYDROCHLORIDE 20 MG/ML
INJECTION, SOLUTION INFILTRATION; PERINEURAL AS NEEDED
Status: DISCONTINUED | OUTPATIENT
Start: 2021-04-19 | End: 2021-04-19 | Stop reason: HOSPADM

## 2021-04-19 RX ORDER — SODIUM CHLORIDE 0.9 % (FLUSH) 0.9 %
10 SYRINGE (ML) INJECTION AS NEEDED
Status: DISCONTINUED | OUTPATIENT
Start: 2021-04-19 | End: 2021-04-22 | Stop reason: HOSPADM

## 2021-04-19 RX ORDER — NICARDIPINE HYDROCHLORIDE 2.5 MG/ML
INJECTION INTRAVENOUS AS NEEDED
Status: DISCONTINUED | OUTPATIENT
Start: 2021-04-19 | End: 2021-04-19 | Stop reason: HOSPADM

## 2021-04-19 RX ORDER — ACETAMINOPHEN 325 MG/1
650 TABLET ORAL EVERY 4 HOURS PRN
Status: DISCONTINUED | OUTPATIENT
Start: 2021-04-19 | End: 2021-04-19 | Stop reason: SDUPTHER

## 2021-04-19 RX ORDER — HEPARIN SODIUM 1000 [USP'U]/ML
INJECTION, SOLUTION INTRAVENOUS; SUBCUTANEOUS AS NEEDED
Status: DISCONTINUED | OUTPATIENT
Start: 2021-04-19 | End: 2021-04-19 | Stop reason: HOSPADM

## 2021-04-19 RX ORDER — FENTANYL CITRATE 50 UG/ML
INJECTION, SOLUTION INTRAMUSCULAR; INTRAVENOUS AS NEEDED
Status: DISCONTINUED | OUTPATIENT
Start: 2021-04-19 | End: 2021-04-19 | Stop reason: HOSPADM

## 2021-04-19 RX ORDER — CARVEDILOL 25 MG/1
25 TABLET ORAL 2 TIMES DAILY WITH MEALS
Status: DISCONTINUED | OUTPATIENT
Start: 2021-04-19 | End: 2021-04-22 | Stop reason: HOSPADM

## 2021-04-19 RX ORDER — SODIUM CHLORIDE 0.9 % (FLUSH) 0.9 %
3 SYRINGE (ML) INJECTION EVERY 12 HOURS SCHEDULED
Status: DISCONTINUED | OUTPATIENT
Start: 2021-04-19 | End: 2021-04-22 | Stop reason: HOSPADM

## 2021-04-19 RX ORDER — MIDAZOLAM HYDROCHLORIDE 1 MG/ML
INJECTION INTRAMUSCULAR; INTRAVENOUS AS NEEDED
Status: DISCONTINUED | OUTPATIENT
Start: 2021-04-19 | End: 2021-04-19 | Stop reason: HOSPADM

## 2021-04-19 RX ADMIN — HYDRALAZINE HYDROCHLORIDE 10 MG: 20 INJECTION INTRAMUSCULAR; INTRAVENOUS at 06:33

## 2021-04-19 RX ADMIN — CARVEDILOL 25 MG: 25 TABLET, FILM COATED ORAL at 08:04

## 2021-04-19 RX ADMIN — ATORVASTATIN CALCIUM 20 MG: 20 TABLET, FILM COATED ORAL at 21:27

## 2021-04-19 RX ADMIN — SPIRONOLACTONE 50 MG: 25 TABLET ORAL at 08:05

## 2021-04-19 RX ADMIN — AMLODIPINE BESYLATE 5 MG: 5 TABLET ORAL at 08:05

## 2021-04-19 RX ADMIN — ASPIRIN 325 MG ORAL TABLET 325 MG: 325 PILL ORAL at 08:05

## 2021-04-19 RX ADMIN — SODIUM CHLORIDE 100 ML/HR: 9 INJECTION, SOLUTION INTRAVENOUS at 14:26

## 2021-04-19 RX ADMIN — Medication 600 MG: at 08:04

## 2021-04-19 RX ADMIN — HYDRALAZINE HYDROCHLORIDE 50 MG: 25 TABLET, FILM COATED ORAL at 02:59

## 2021-04-19 RX ADMIN — Medication 10 ML: at 08:05

## 2021-04-19 RX ADMIN — SPIRONOLACTONE 50 MG: 25 TABLET ORAL at 21:27

## 2021-04-19 RX ADMIN — SPIRONOLACTONE 50 MG: 25 TABLET ORAL at 15:18

## 2021-04-19 RX ADMIN — CARVEDILOL 25 MG: 25 TABLET, FILM COATED ORAL at 18:49

## 2021-04-19 RX ADMIN — HYDRALAZINE HYDROCHLORIDE 50 MG: 25 TABLET, FILM COATED ORAL at 14:42

## 2021-04-19 RX ADMIN — NICOTINE 1 PATCH: 7 PATCH, EXTENDED RELEASE TRANSDERMAL at 13:07

## 2021-04-19 RX ADMIN — HYDROXYZINE HYDROCHLORIDE 50 MG: 25 TABLET, FILM COATED ORAL at 06:33

## 2021-04-20 LAB
ALBUMIN SERPL-MCNC: 3.9 G/DL (ref 3.5–5.2)
ANION GAP SERPL CALCULATED.3IONS-SCNC: 11 MMOL/L (ref 5–15)
BASOPHILS # BLD AUTO: 0.1 10*3/MM3 (ref 0–0.2)
BASOPHILS NFR BLD AUTO: 0.9 % (ref 0–1.5)
BUN SERPL-MCNC: 13 MG/DL (ref 6–20)
BUN/CREAT SERPL: 14.3 (ref 7–25)
CALCIUM SPEC-SCNC: 9.5 MG/DL (ref 8.6–10.5)
CHLORIDE SERPL-SCNC: 104 MMOL/L (ref 98–107)
CO2 SERPL-SCNC: 23 MMOL/L (ref 22–29)
CREAT SERPL-MCNC: 0.91 MG/DL (ref 0.57–1)
DEPRECATED RDW RBC AUTO: 43.8 FL (ref 37–54)
EOSINOPHIL # BLD AUTO: 0.5 10*3/MM3 (ref 0–0.4)
EOSINOPHIL NFR BLD AUTO: 5.5 % (ref 0.3–6.2)
ERYTHROCYTE [DISTWIDTH] IN BLOOD BY AUTOMATED COUNT: 16.5 % (ref 12.3–15.4)
GFR SERPL CREATININE-BSD FRML MDRD: 70 ML/MIN/1.73
GLUCOSE SERPL-MCNC: 114 MG/DL (ref 65–99)
HCT VFR BLD AUTO: 38.5 % (ref 34–46.6)
HGB BLD-MCNC: 12.5 G/DL (ref 12–15.9)
LYMPHOCYTES # BLD AUTO: 1.3 10*3/MM3 (ref 0.7–3.1)
LYMPHOCYTES NFR BLD AUTO: 13 % (ref 19.6–45.3)
MAGNESIUM SERPL-MCNC: 2.2 MG/DL (ref 1.6–2.6)
MCH RBC QN AUTO: 23.8 PG (ref 26.6–33)
MCHC RBC AUTO-ENTMCNC: 32.3 G/DL (ref 31.5–35.7)
MCV RBC AUTO: 73.8 FL (ref 79–97)
MONOCYTES # BLD AUTO: 0.8 10*3/MM3 (ref 0.1–0.9)
MONOCYTES NFR BLD AUTO: 7.8 % (ref 5–12)
NEUTROPHILS NFR BLD AUTO: 7.2 10*3/MM3 (ref 1.7–7)
NEUTROPHILS NFR BLD AUTO: 72.8 % (ref 42.7–76)
NRBC BLD AUTO-RTO: 0.1 /100 WBC (ref 0–0.2)
PHOSPHATE SERPL-MCNC: 3.7 MG/DL (ref 2.5–4.5)
PLATELET # BLD AUTO: 364 10*3/MM3 (ref 140–450)
PMV BLD AUTO: 8.8 FL (ref 6–12)
POTASSIUM SERPL-SCNC: 4.1 MMOL/L (ref 3.5–5.2)
RBC # BLD AUTO: 5.22 10*6/MM3 (ref 3.77–5.28)
SODIUM SERPL-SCNC: 138 MMOL/L (ref 136–145)
WBC # BLD AUTO: 9.9 10*3/MM3 (ref 3.4–10.8)

## 2021-04-20 PROCEDURE — 25010000002 ENOXAPARIN PER 10 MG: Performed by: INTERNAL MEDICINE

## 2021-04-20 PROCEDURE — 80069 RENAL FUNCTION PANEL: CPT | Performed by: INTERNAL MEDICINE

## 2021-04-20 PROCEDURE — 25010000002 FUROSEMIDE PER 20 MG: Performed by: INTERNAL MEDICINE

## 2021-04-20 PROCEDURE — 83735 ASSAY OF MAGNESIUM: CPT | Performed by: INTERNAL MEDICINE

## 2021-04-20 PROCEDURE — 93005 ELECTROCARDIOGRAM TRACING: CPT | Performed by: NURSE PRACTITIONER

## 2021-04-20 PROCEDURE — 99233 SBSQ HOSP IP/OBS HIGH 50: CPT | Performed by: INTERNAL MEDICINE

## 2021-04-20 PROCEDURE — 85025 COMPLETE CBC W/AUTO DIFF WBC: CPT | Performed by: INTERNAL MEDICINE

## 2021-04-20 PROCEDURE — 99232 SBSQ HOSP IP/OBS MODERATE 35: CPT | Performed by: INTERNAL MEDICINE

## 2021-04-20 RX ORDER — LISINOPRIL 5 MG/1
5 TABLET ORAL
Status: DISCONTINUED | OUTPATIENT
Start: 2021-04-20 | End: 2021-04-20

## 2021-04-20 RX ORDER — SPIRONOLACTONE 25 MG/1
50 TABLET ORAL DAILY
Status: DISCONTINUED | OUTPATIENT
Start: 2021-04-21 | End: 2021-04-22 | Stop reason: HOSPADM

## 2021-04-20 RX ORDER — LISINOPRIL 5 MG/1
10 TABLET ORAL
Status: DISCONTINUED | OUTPATIENT
Start: 2021-04-21 | End: 2021-04-21

## 2021-04-20 RX ORDER — FUROSEMIDE 10 MG/ML
40 INJECTION INTRAMUSCULAR; INTRAVENOUS ONCE
Status: COMPLETED | OUTPATIENT
Start: 2021-04-20 | End: 2021-04-20

## 2021-04-20 RX ADMIN — HYDRALAZINE HYDROCHLORIDE 50 MG: 25 TABLET, FILM COATED ORAL at 16:01

## 2021-04-20 RX ADMIN — AMLODIPINE BESYLATE 5 MG: 5 TABLET ORAL at 08:13

## 2021-04-20 RX ADMIN — Medication 10 ML: at 10:01

## 2021-04-20 RX ADMIN — Medication 10 ML: at 08:13

## 2021-04-20 RX ADMIN — NICOTINE 1 PATCH: 7 PATCH, EXTENDED RELEASE TRANSDERMAL at 10:01

## 2021-04-20 RX ADMIN — FUROSEMIDE 40 MG: 10 INJECTION, SOLUTION INTRAMUSCULAR; INTRAVENOUS at 09:59

## 2021-04-20 RX ADMIN — Medication 10 ML: at 08:15

## 2021-04-20 RX ADMIN — LISINOPRIL 5 MG: 5 TABLET ORAL at 08:15

## 2021-04-20 RX ADMIN — CARVEDILOL 25 MG: 25 TABLET, FILM COATED ORAL at 18:01

## 2021-04-20 RX ADMIN — Medication 10 ML: at 22:42

## 2021-04-20 RX ADMIN — CARVEDILOL 25 MG: 25 TABLET, FILM COATED ORAL at 08:13

## 2021-04-20 RX ADMIN — FUROSEMIDE 40 MG: 40 TABLET ORAL at 18:01

## 2021-04-20 RX ADMIN — SPIRONOLACTONE 50 MG: 25 TABLET ORAL at 08:12

## 2021-04-20 RX ADMIN — ASPIRIN 325 MG ORAL TABLET 325 MG: 325 PILL ORAL at 08:13

## 2021-04-20 RX ADMIN — HYDRALAZINE HYDROCHLORIDE 50 MG: 25 TABLET, FILM COATED ORAL at 22:41

## 2021-04-20 RX ADMIN — ATORVASTATIN CALCIUM 20 MG: 20 TABLET, FILM COATED ORAL at 22:41

## 2021-04-20 RX ADMIN — HYDRALAZINE HYDROCHLORIDE 50 MG: 25 TABLET, FILM COATED ORAL at 05:53

## 2021-04-20 RX ADMIN — HYDROXYZINE HYDROCHLORIDE 50 MG: 25 TABLET, FILM COATED ORAL at 22:41

## 2021-04-20 RX ADMIN — FUROSEMIDE 40 MG: 40 TABLET ORAL at 08:13

## 2021-04-20 RX ADMIN — ENOXAPARIN SODIUM 40 MG: 40 INJECTION SUBCUTANEOUS at 16:03

## 2021-04-20 RX ADMIN — Medication 3 ML: at 22:43

## 2021-04-21 LAB
ALBUMIN SERPL-MCNC: 3.8 G/DL (ref 3.5–5.2)
ANION GAP SERPL CALCULATED.3IONS-SCNC: 12 MMOL/L (ref 5–15)
BASOPHILS # BLD AUTO: 0.1 10*3/MM3 (ref 0–0.2)
BASOPHILS NFR BLD AUTO: 1.1 % (ref 0–1.5)
BUN SERPL-MCNC: 17 MG/DL (ref 6–20)
BUN/CREAT SERPL: 15.5 (ref 7–25)
CALCIUM SPEC-SCNC: 9.5 MG/DL (ref 8.6–10.5)
CHLORIDE SERPL-SCNC: 101 MMOL/L (ref 98–107)
CO2 SERPL-SCNC: 23 MMOL/L (ref 22–29)
CREAT SERPL-MCNC: 1.1 MG/DL (ref 0.57–1)
DEPRECATED RDW RBC AUTO: 43.3 FL (ref 37–54)
EOSINOPHIL # BLD AUTO: 0.6 10*3/MM3 (ref 0–0.4)
EOSINOPHIL NFR BLD AUTO: 4.9 % (ref 0.3–6.2)
ERYTHROCYTE [DISTWIDTH] IN BLOOD BY AUTOMATED COUNT: 16.7 % (ref 12.3–15.4)
GFR SERPL CREATININE-BSD FRML MDRD: 56 ML/MIN/1.73
GLUCOSE SERPL-MCNC: 110 MG/DL (ref 65–99)
HCT VFR BLD AUTO: 37.6 % (ref 34–46.6)
HGB BLD-MCNC: 11.9 G/DL (ref 12–15.9)
LYMPHOCYTES # BLD AUTO: 1.2 10*3/MM3 (ref 0.7–3.1)
LYMPHOCYTES NFR BLD AUTO: 10.4 % (ref 19.6–45.3)
MAGNESIUM SERPL-MCNC: 2.2 MG/DL (ref 1.6–2.6)
MCH RBC QN AUTO: 23.3 PG (ref 26.6–33)
MCHC RBC AUTO-ENTMCNC: 31.6 G/DL (ref 31.5–35.7)
MCV RBC AUTO: 73.9 FL (ref 79–97)
MONOCYTES # BLD AUTO: 1.2 10*3/MM3 (ref 0.1–0.9)
MONOCYTES NFR BLD AUTO: 10.2 % (ref 5–12)
NEUTROPHILS NFR BLD AUTO: 73.4 % (ref 42.7–76)
NEUTROPHILS NFR BLD AUTO: 8.6 10*3/MM3 (ref 1.7–7)
NRBC BLD AUTO-RTO: 0.1 /100 WBC (ref 0–0.2)
PHOSPHATE SERPL-MCNC: 4.8 MG/DL (ref 2.5–4.5)
PLATELET # BLD AUTO: 349 10*3/MM3 (ref 140–450)
PMV BLD AUTO: 9.1 FL (ref 6–12)
POTASSIUM SERPL-SCNC: 4.2 MMOL/L (ref 3.5–5.2)
RBC # BLD AUTO: 5.09 10*6/MM3 (ref 3.77–5.28)
SODIUM SERPL-SCNC: 136 MMOL/L (ref 136–145)
WBC # BLD AUTO: 11.7 10*3/MM3 (ref 3.4–10.8)

## 2021-04-21 PROCEDURE — 80069 RENAL FUNCTION PANEL: CPT | Performed by: INTERNAL MEDICINE

## 2021-04-21 PROCEDURE — 99232 SBSQ HOSP IP/OBS MODERATE 35: CPT | Performed by: INTERNAL MEDICINE

## 2021-04-21 PROCEDURE — 99233 SBSQ HOSP IP/OBS HIGH 50: CPT | Performed by: INTERNAL MEDICINE

## 2021-04-21 PROCEDURE — 25010000002 FUROSEMIDE PER 20 MG: Performed by: INTERNAL MEDICINE

## 2021-04-21 PROCEDURE — 85025 COMPLETE CBC W/AUTO DIFF WBC: CPT | Performed by: INTERNAL MEDICINE

## 2021-04-21 PROCEDURE — 83735 ASSAY OF MAGNESIUM: CPT | Performed by: INTERNAL MEDICINE

## 2021-04-21 RX ORDER — FUROSEMIDE 10 MG/ML
40 INJECTION INTRAMUSCULAR; INTRAVENOUS
Status: DISCONTINUED | OUTPATIENT
Start: 2021-04-21 | End: 2021-04-22

## 2021-04-21 RX ORDER — LISINOPRIL 5 MG/1
10 TABLET ORAL EVERY 12 HOURS SCHEDULED
Status: DISCONTINUED | OUTPATIENT
Start: 2021-04-21 | End: 2021-04-21

## 2021-04-21 RX ORDER — HYDRALAZINE HYDROCHLORIDE 25 MG/1
75 TABLET, FILM COATED ORAL EVERY 12 HOURS SCHEDULED
Status: DISCONTINUED | OUTPATIENT
Start: 2021-04-21 | End: 2021-04-22 | Stop reason: HOSPADM

## 2021-04-21 RX ORDER — LISINOPRIL 20 MG/1
20 TABLET ORAL EVERY 12 HOURS SCHEDULED
Status: DISCONTINUED | OUTPATIENT
Start: 2021-04-21 | End: 2021-04-21

## 2021-04-21 RX ORDER — LISINOPRIL 5 MG/1
10 TABLET ORAL EVERY 12 HOURS SCHEDULED
Status: DISCONTINUED | OUTPATIENT
Start: 2021-04-21 | End: 2021-04-22 | Stop reason: HOSPADM

## 2021-04-21 RX ORDER — FUROSEMIDE 10 MG/ML
40 INJECTION INTRAMUSCULAR; INTRAVENOUS ONCE
Status: DISCONTINUED | OUTPATIENT
Start: 2021-04-21 | End: 2021-04-21

## 2021-04-21 RX ORDER — FUROSEMIDE 10 MG/ML
40 INJECTION INTRAMUSCULAR; INTRAVENOUS ONCE
Status: COMPLETED | OUTPATIENT
Start: 2021-04-21 | End: 2021-04-21

## 2021-04-21 RX ADMIN — LISINOPRIL 10 MG: 5 TABLET ORAL at 21:04

## 2021-04-21 RX ADMIN — FUROSEMIDE 40 MG: 10 INJECTION, SOLUTION INTRAMUSCULAR; INTRAVENOUS at 12:01

## 2021-04-21 RX ADMIN — FUROSEMIDE 40 MG: 40 TABLET ORAL at 08:51

## 2021-04-21 RX ADMIN — CARVEDILOL 25 MG: 25 TABLET, FILM COATED ORAL at 17:09

## 2021-04-21 RX ADMIN — CARVEDILOL 25 MG: 25 TABLET, FILM COATED ORAL at 08:50

## 2021-04-21 RX ADMIN — LISINOPRIL 10 MG: 5 TABLET ORAL at 08:50

## 2021-04-21 RX ADMIN — Medication 10 ML: at 08:50

## 2021-04-21 RX ADMIN — LISINOPRIL 10 MG: 5 TABLET ORAL at 12:02

## 2021-04-21 RX ADMIN — FUROSEMIDE 40 MG: 10 INJECTION, SOLUTION INTRAMUSCULAR; INTRAVENOUS at 17:08

## 2021-04-21 RX ADMIN — Medication 3 ML: at 21:07

## 2021-04-21 RX ADMIN — HYDRALAZINE HYDROCHLORIDE 50 MG: 25 TABLET, FILM COATED ORAL at 05:18

## 2021-04-21 RX ADMIN — SPIRONOLACTONE 50 MG: 25 TABLET ORAL at 08:50

## 2021-04-21 RX ADMIN — ASPIRIN 325 MG ORAL TABLET 325 MG: 325 PILL ORAL at 08:51

## 2021-04-21 RX ADMIN — NICOTINE 1 PATCH: 7 PATCH, EXTENDED RELEASE TRANSDERMAL at 08:52

## 2021-04-21 RX ADMIN — ATORVASTATIN CALCIUM 20 MG: 20 TABLET, FILM COATED ORAL at 21:03

## 2021-04-21 RX ADMIN — Medication 10 ML: at 21:02

## 2021-04-21 RX ADMIN — HYDROXYZINE HYDROCHLORIDE 50 MG: 25 TABLET, FILM COATED ORAL at 21:06

## 2021-04-21 RX ADMIN — HYDRALAZINE HYDROCHLORIDE 75 MG: 25 TABLET, FILM COATED ORAL at 21:05

## 2021-04-22 VITALS
BODY MASS INDEX: 40.7 KG/M2 | RESPIRATION RATE: 18 BRPM | WEIGHT: 244.27 LBS | HEART RATE: 101 BPM | HEIGHT: 65 IN | SYSTOLIC BLOOD PRESSURE: 139 MMHG | DIASTOLIC BLOOD PRESSURE: 86 MMHG | OXYGEN SATURATION: 95 % | TEMPERATURE: 98.8 F

## 2021-04-22 PROBLEM — I50.811 ACUTE RIGHT-SIDED CONGESTIVE HEART FAILURE: Status: RESOLVED | Noted: 2021-04-16 | Resolved: 2021-04-22

## 2021-04-22 LAB
ALBUMIN SERPL-MCNC: 4.1 G/DL (ref 3.5–5.2)
ANION GAP SERPL CALCULATED.3IONS-SCNC: 11 MMOL/L (ref 5–15)
BASOPHILS # BLD AUTO: 0.2 10*3/MM3 (ref 0–0.2)
BASOPHILS NFR BLD AUTO: 1.4 % (ref 0–1.5)
BUN SERPL-MCNC: 22 MG/DL (ref 6–20)
BUN/CREAT SERPL: 19 (ref 7–25)
CALCIUM SPEC-SCNC: 9.4 MG/DL (ref 8.6–10.5)
CHLORIDE SERPL-SCNC: 100 MMOL/L (ref 98–107)
CO2 SERPL-SCNC: 24 MMOL/L (ref 22–29)
CREAT SERPL-MCNC: 1.16 MG/DL (ref 0.57–1)
DEPRECATED RDW RBC AUTO: 43.8 FL (ref 37–54)
EOSINOPHIL # BLD AUTO: 0.5 10*3/MM3 (ref 0–0.4)
EOSINOPHIL NFR BLD AUTO: 4.1 % (ref 0.3–6.2)
ERYTHROCYTE [DISTWIDTH] IN BLOOD BY AUTOMATED COUNT: 16.8 % (ref 12.3–15.4)
GFR SERPL CREATININE-BSD FRML MDRD: 53 ML/MIN/1.73
GLUCOSE SERPL-MCNC: 123 MG/DL (ref 65–99)
HCT VFR BLD AUTO: 36.8 % (ref 34–46.6)
HGB BLD-MCNC: 12.1 G/DL (ref 12–15.9)
LYMPHOCYTES # BLD AUTO: 1.1 10*3/MM3 (ref 0.7–3.1)
LYMPHOCYTES NFR BLD AUTO: 8.2 % (ref 19.6–45.3)
MAGNESIUM SERPL-MCNC: 2.1 MG/DL (ref 1.6–2.6)
MCH RBC QN AUTO: 24 PG (ref 26.6–33)
MCHC RBC AUTO-ENTMCNC: 32.8 G/DL (ref 31.5–35.7)
MCV RBC AUTO: 73.1 FL (ref 79–97)
MONOCYTES # BLD AUTO: 1 10*3/MM3 (ref 0.1–0.9)
MONOCYTES NFR BLD AUTO: 8 % (ref 5–12)
NEUTROPHILS NFR BLD AUTO: 10.1 10*3/MM3 (ref 1.7–7)
NEUTROPHILS NFR BLD AUTO: 78.3 % (ref 42.7–76)
NRBC BLD AUTO-RTO: 0.1 /100 WBC (ref 0–0.2)
PHOSPHATE SERPL-MCNC: 4.3 MG/DL (ref 2.5–4.5)
PLATELET # BLD AUTO: 367 10*3/MM3 (ref 140–450)
PMV BLD AUTO: 8.6 FL (ref 6–12)
POTASSIUM SERPL-SCNC: 4.6 MMOL/L (ref 3.5–5.2)
QT INTERVAL: 434 MS
RBC # BLD AUTO: 5.04 10*6/MM3 (ref 3.77–5.28)
SODIUM SERPL-SCNC: 135 MMOL/L (ref 136–145)
WBC # BLD AUTO: 12.9 10*3/MM3 (ref 3.4–10.8)

## 2021-04-22 PROCEDURE — 83735 ASSAY OF MAGNESIUM: CPT | Performed by: INTERNAL MEDICINE

## 2021-04-22 PROCEDURE — 80069 RENAL FUNCTION PANEL: CPT | Performed by: INTERNAL MEDICINE

## 2021-04-22 PROCEDURE — 99239 HOSP IP/OBS DSCHRG MGMT >30: CPT | Performed by: INTERNAL MEDICINE

## 2021-04-22 PROCEDURE — 85025 COMPLETE CBC W/AUTO DIFF WBC: CPT | Performed by: INTERNAL MEDICINE

## 2021-04-22 PROCEDURE — 25010000002 FUROSEMIDE PER 20 MG: Performed by: INTERNAL MEDICINE

## 2021-04-22 RX ORDER — SPIRONOLACTONE 50 MG/1
50 TABLET, FILM COATED ORAL DAILY
Qty: 30 TABLET | Refills: 0 | Status: SHIPPED | OUTPATIENT
Start: 2021-04-23 | End: 2021-04-28 | Stop reason: SDUPTHER

## 2021-04-22 RX ORDER — CARVEDILOL 25 MG/1
25 TABLET ORAL 2 TIMES DAILY WITH MEALS
Qty: 60 TABLET | Refills: 0 | Status: SHIPPED | OUTPATIENT
Start: 2021-04-22 | End: 2021-05-17 | Stop reason: SDUPTHER

## 2021-04-22 RX ORDER — FUROSEMIDE 40 MG/1
40 TABLET ORAL 2 TIMES DAILY
Qty: 60 TABLET | Refills: 0 | Status: SHIPPED | OUTPATIENT
Start: 2021-04-22 | End: 2021-04-22

## 2021-04-22 RX ORDER — LISINOPRIL 10 MG/1
10 TABLET ORAL EVERY 12 HOURS SCHEDULED
Qty: 60 TABLET | Refills: 0 | Status: SHIPPED | OUTPATIENT
Start: 2021-04-22 | End: 2021-04-22

## 2021-04-22 RX ORDER — SPIRONOLACTONE 50 MG/1
50 TABLET, FILM COATED ORAL DAILY
Qty: 30 TABLET | Refills: 0 | Status: SHIPPED | OUTPATIENT
Start: 2021-04-23 | End: 2021-04-22

## 2021-04-22 RX ORDER — HYDRALAZINE HYDROCHLORIDE 25 MG/1
75 TABLET, FILM COATED ORAL EVERY 12 HOURS SCHEDULED
Qty: 60 TABLET | Refills: 0 | Status: SHIPPED | OUTPATIENT
Start: 2021-04-22 | End: 2021-04-22

## 2021-04-22 RX ORDER — FUROSEMIDE 40 MG/1
40 TABLET ORAL 2 TIMES DAILY
Qty: 60 TABLET | Refills: 0 | Status: SHIPPED | OUTPATIENT
Start: 2021-04-22 | End: 2021-05-17 | Stop reason: SDUPTHER

## 2021-04-22 RX ORDER — LISINOPRIL 10 MG/1
10 TABLET ORAL EVERY 12 HOURS SCHEDULED
Qty: 60 TABLET | Refills: 0 | Status: SHIPPED | OUTPATIENT
Start: 2021-04-22 | End: 2021-04-28

## 2021-04-22 RX ORDER — FUROSEMIDE 40 MG/1
40 TABLET ORAL
Status: DISCONTINUED | OUTPATIENT
Start: 2021-04-22 | End: 2021-04-22 | Stop reason: HOSPADM

## 2021-04-22 RX ORDER — CARVEDILOL 25 MG/1
25 TABLET ORAL 2 TIMES DAILY WITH MEALS
Qty: 60 TABLET | Refills: 0 | Status: SHIPPED | OUTPATIENT
Start: 2021-04-22 | End: 2021-04-22

## 2021-04-22 RX ORDER — HYDRALAZINE HYDROCHLORIDE 25 MG/1
75 TABLET, FILM COATED ORAL EVERY 12 HOURS SCHEDULED
Qty: 60 TABLET | Refills: 0 | Status: SHIPPED | OUTPATIENT
Start: 2021-04-22 | End: 2021-04-28

## 2021-04-22 RX ADMIN — HYDRALAZINE HYDROCHLORIDE 75 MG: 25 TABLET, FILM COATED ORAL at 08:33

## 2021-04-22 RX ADMIN — Medication 10 ML: at 08:34

## 2021-04-22 RX ADMIN — LISINOPRIL 10 MG: 5 TABLET ORAL at 08:34

## 2021-04-22 RX ADMIN — ASPIRIN 325 MG ORAL TABLET 325 MG: 325 PILL ORAL at 08:34

## 2021-04-22 RX ADMIN — SPIRONOLACTONE 50 MG: 25 TABLET ORAL at 08:33

## 2021-04-22 RX ADMIN — FUROSEMIDE 40 MG: 10 INJECTION, SOLUTION INTRAMUSCULAR; INTRAVENOUS at 08:33

## 2021-04-22 RX ADMIN — NICOTINE 1 PATCH: 7 PATCH, EXTENDED RELEASE TRANSDERMAL at 08:34

## 2021-04-22 RX ADMIN — CARVEDILOL 25 MG: 25 TABLET, FILM COATED ORAL at 08:34

## 2021-04-23 ENCOUNTER — TELEPHONE (OUTPATIENT)
Dept: CARDIOLOGY | Facility: CLINIC | Age: 36
End: 2021-04-23

## 2021-04-23 ENCOUNTER — READMISSION MANAGEMENT (OUTPATIENT)
Dept: CALL CENTER | Facility: HOSPITAL | Age: 36
End: 2021-04-23

## 2021-04-23 NOTE — OUTREACH NOTE
Prep Survey      Responses   [facility] patient discharged from?  [facility]   Is LACE score < 7 ?  No   Emergency Room discharge w/ pulse ox?  No   Eligibility  Readm Mgmt   Discharge diagnosis  CHF   Does the patient have one of the following disease processes/diagnoses(primary or secondary)?  CHF   Does the patient have Home health ordered?  No   Is there a DME ordered?  Yes   What DME was ordered?  lifevest   Comments regarding appointments  call for apmts   Medication alerts for this patient  see AVS   Prep survey completed?  Yes          Barbara Stephens RN

## 2021-04-24 LAB
ALDOST SERPL-MCNC: 15.6 NG/DL (ref 0–30)
ALDOST/RENIN PLAS-RTO: 8.6 {RATIO} (ref 0–30)
RENIN PLAS-CCNC: 1.81 NG/ML/HR (ref 0.17–5.38)

## 2021-04-25 LAB — ALDOST SERPL-MCNC: 4.5 NG/DL (ref 0–30)

## 2021-04-26 ENCOUNTER — READMISSION MANAGEMENT (OUTPATIENT)
Dept: CALL CENTER | Facility: HOSPITAL | Age: 36
End: 2021-04-26

## 2021-04-26 NOTE — OUTREACH NOTE
CHF Week 1 Survey      Responses   [facility] patient discharged from?  [facility]   Does the patient have one of the following disease processes/diagnoses(primary or secondary)?  CHF   CHF Week 1 attempt successful?  Yes   Call start time  1540   Call end time  1544   Is patient permission given to speak with other caregiver?  No   Meds reviewed with patient/caregiver?  Yes   Is the patient having any side effects they believe may be caused by any medication additions or changes?  No   Does the patient have all medications ordered at discharge?  Yes   Is the patient taking all medications as directed (includes completed medication regime)?  Yes   Comments regarding appointments  Pt to pcp this week.   Does the patient have a primary care provider?   Yes   Does the patient have an appointment with their PCP within 7 days of discharge?  Yes   Has the patient kept scheduled appointments due by today?  N/A    CHF Week 1 call completed?  Yes   Revoked  No further contact(revokes)-requires comment   Is the patient interested in additional calls from an ambulatory ?  NOTE:  applies to high risk patients requiring additional follow-up.  No   Graduated/Revoked comments  Pt states she lives with her grandmother who is a RN and does not need us to call her.          Ivone Hartmann RN

## 2021-04-28 ENCOUNTER — OFFICE VISIT (OUTPATIENT)
Dept: CARDIOLOGY | Facility: CLINIC | Age: 36
End: 2021-04-28

## 2021-04-28 VITALS
HEIGHT: 65 IN | SYSTOLIC BLOOD PRESSURE: 134 MMHG | HEART RATE: 81 BPM | BODY MASS INDEX: 40.98 KG/M2 | RESPIRATION RATE: 18 BRPM | WEIGHT: 246 LBS | DIASTOLIC BLOOD PRESSURE: 90 MMHG

## 2021-04-28 DIAGNOSIS — I50.21 ACUTE SYSTOLIC CHF (CONGESTIVE HEART FAILURE) (HCC): Primary | ICD-10-CM

## 2021-04-28 PROCEDURE — 99214 OFFICE O/P EST MOD 30 MIN: CPT | Performed by: INTERNAL MEDICINE

## 2021-04-28 RX ORDER — SPIRONOLACTONE 50 MG/1
25 TABLET, FILM COATED ORAL DAILY
Qty: 30 TABLET | Refills: 5 | Status: SHIPPED | OUTPATIENT
Start: 2021-04-28 | End: 2021-05-17 | Stop reason: DRUGHIGH

## 2021-04-28 RX ORDER — IBUPROFEN 600 MG/1
600 TABLET ORAL EVERY 6 HOURS PRN
COMMUNITY

## 2021-04-28 RX ORDER — SACUBITRIL AND VALSARTAN 24; 26 MG/1; MG/1
1 TABLET, FILM COATED ORAL 2 TIMES DAILY
Qty: 60 TABLET | Refills: 3 | Status: SHIPPED | OUTPATIENT
Start: 2021-04-28 | End: 2021-05-17 | Stop reason: DRUGHIGH

## 2021-04-28 NOTE — PROGRESS NOTES
Cardiology clinic note  Joshua Vyas MD, PhD  Subjective:     Encounter Date:04/28/2021      Patient ID: Shannan Galindo is a 36 y.o. female.    Chief Complaint:  Chief Complaint   Patient presents with   • Hospital Follow Up Visit       HPI:  History of Present Illness  At the pleasure to see this very pleasant 36-year-old female with nonischemic cardiomyopathy EF of 15 to 20%.  She was hospitalized with severe hypertension which was controlled and she was placed on goal-directed medical therapy.  Left heart catheterization revealed no stricture significant obstructive coronary disease and high filling pressures.  She has a global cardiomyopathy in nature with underlying comorbidities of tobacco abuse, obesity essential hypertension.  She was previously on lisinopril 10 mg twice daily in combination with Aldactone 50 daily and Coreg 25 twice daily.  She remains on Coreg and we will ultimately switch her to Entresto 24/26 twice daily and stop her lisinopril 48 hours and then start her Entresto.  She has had some low blood pressures at home and is stopped her hydralazine altogether which we agree with.  We would rather be her be on Entresto Aldactone and Coreg in combination with her Lasix for volume reduction as optimal goal-directed medical therapy.  We did discuss referral to [facility] heart failure clinic for establishment of care given severe cardiomyopathy and young age.    Review of systems negative x14 point review of systems except as mentioned above    Historical data copied forward from previous encounters numerous unchanged    The following portions of the patient's history were reviewed and updated as appropriate: allergies, current medications, past family history, past medical history, past social history, past surgical history and problem list.    Problem List:  Patient Active Problem List   Diagnosis   • HTN (hypertension)   • Anemia   • Morbid obesity (CMS/HCC)   • HLD (hyperlipidemia)  "  • Tobacco abuse       Past Medical History:  History reviewed. No pertinent past medical history.    Past Surgical History:  Past Surgical History:   Procedure Laterality Date   • CARDIAC CATHETERIZATION N/A 2021    Procedure: Left Heart Cath possible PCI, atherectomy, hemodynamic support;  Surgeon: Joshua Vyas MD;  Location: [facility] CATH INVASIVE LOCATION;  Service: Cardiology;  Laterality: N/A;   • WISDOM TOOTH EXTRACTION         Social History:  Social History     Socioeconomic History   • Marital status: Single     Spouse name: Not on file   • Number of children: Not on file   • Years of education: Not on file   • Highest education level: Not on file   Tobacco Use   • Smoking status: Former Smoker     Packs/day: 0.50     Quit date: 2021     Years since quittin.0   • Smokeless tobacco: Never Used   Vaping Use   • Vaping Use: Never used   Substance and Sexual Activity   • Alcohol use: Not Currently     Alcohol/week: 1.0 standard drinks     Types: 1 Glasses of wine per week     Comment: Occ.   • Drug use: Never   • Sexual activity: Defer       Allergies:  No Known Allergies    Immunizations:    There is no immunization history on file for this patient.    ROS:  ROS       Objective:         /90 (BP Location: Left arm, Patient Position: Sitting)   Pulse 81   Resp 18   Ht 165.1 cm (65\")   Wt 112 kg (246 lb)   BMI 40.94 kg/m²     Physical Exam  Regular rate rhythm no rubs gallops no heave no lift  Normocephalic atraumatic was around  Positive HJR without much JVD  Pitting edema bilaterally left greater than right 2+ left 1+ right  Normal cap refill normal radial pulses    In-Office Procedure(s):  Procedures    ASCVD RIsk Score::  The ASCVD Risk score (Nadya DC Jr., et al., 2013) failed to calculate for the following reasons:    The 2013 ASCVD risk score is only valid for ages 40 to 79    Recent Radiology:  Imaging Results (Most Recent)     None          Lab Review:   No results " displayed because visit has over 200 results.                   Assessment:         No diagnosis found.       Plan:      1. Acute systolic heart failure / volume overload / NICM  - LVEF 18% at [place], will repeat 2D ECHO 4/16 here showed EF = 15-20% with moderate RV dysfunction, mild MR/TR, mild pulmonary hypertension, grade 3 diastolic dysfunction.  -Continue Coreg  Aldactone 25 daily  Change to Entresto after 48 hours off lisinopril, Entresto 24/26 twice daily  Hydralazine will be used for systolic greater than 160       2.  Stage II essential hypertension  -Coreg, Entresto, Lasix, Aldactone, as needed hydralazine     3. Anemia  -Stable     4. Obesity, diet and exercise per AHA guidelines     5. Likely COOPER, sleep study recommended     6. Tobacco abuse, cessation discussed, she is been off cigarettes since her hospitalization     7. Subclinical hypothyroidism  - internal medicine following     8. CKD  - Cr stable  - nephrology following  Last creatinine 1.2, potassium 4.6, she has decreased her Aldactone recently and she is not on further replacement    Come back to clinic in 30 days  BMP in 2 weeks after starting Entresto    We will ultimately referred to  for establishment of care heart failure clinic    Joshua Vyas MD, PhD  Next greater 25 minutes with face-to-face with the patient as well as in coordination of care, med rec review with all risk and benefits and necessity of each medicine discussed.         Level of Care:                 Joshua Vyas MD  04/28/21  .

## 2021-05-03 ENCOUNTER — TELEPHONE (OUTPATIENT)
Dept: CARDIOLOGY | Facility: CLINIC | Age: 36
End: 2021-05-03

## 2021-05-17 ENCOUNTER — OFFICE VISIT (OUTPATIENT)
Dept: CARDIOLOGY | Facility: CLINIC | Age: 36
End: 2021-05-17

## 2021-05-17 ENCOUNTER — TELEPHONE (OUTPATIENT)
Dept: CARDIOLOGY | Facility: CLINIC | Age: 36
End: 2021-05-17

## 2021-05-17 VITALS
HEART RATE: 81 BPM | HEIGHT: 65 IN | WEIGHT: 222 LBS | RESPIRATION RATE: 18 BRPM | BODY MASS INDEX: 36.99 KG/M2 | SYSTOLIC BLOOD PRESSURE: 136 MMHG | DIASTOLIC BLOOD PRESSURE: 98 MMHG

## 2021-05-17 DIAGNOSIS — I10 ESSENTIAL HYPERTENSION: Primary | ICD-10-CM

## 2021-05-17 DIAGNOSIS — N18.9 CHRONIC KIDNEY DISEASE, UNSPECIFIED CKD STAGE: ICD-10-CM

## 2021-05-17 PROCEDURE — 99214 OFFICE O/P EST MOD 30 MIN: CPT | Performed by: INTERNAL MEDICINE

## 2021-05-17 RX ORDER — FUROSEMIDE 40 MG/1
40 TABLET ORAL 2 TIMES DAILY
Qty: 180 TABLET | Refills: 3 | Status: SHIPPED | OUTPATIENT
Start: 2021-05-17 | End: 2022-03-04 | Stop reason: SDUPTHER

## 2021-05-17 RX ORDER — SPIRONOLACTONE 25 MG/1
12.5 TABLET ORAL DAILY
Qty: 90 TABLET | Refills: 1 | Status: SHIPPED | OUTPATIENT
Start: 2021-05-17 | End: 2022-03-04

## 2021-05-17 RX ORDER — SACUBITRIL AND VALSARTAN 49; 51 MG/1; MG/1
1 TABLET, FILM COATED ORAL 2 TIMES DAILY
Qty: 60 TABLET | Refills: 3 | Status: SHIPPED | OUTPATIENT
Start: 2021-05-17 | End: 2021-06-21 | Stop reason: SDUPTHER

## 2021-05-17 RX ORDER — CARVEDILOL 25 MG/1
25 TABLET ORAL 2 TIMES DAILY WITH MEALS
Qty: 180 TABLET | Refills: 3 | Status: SHIPPED | OUTPATIENT
Start: 2021-05-17 | End: 2021-06-21 | Stop reason: SDUPTHER

## 2021-05-17 NOTE — PROGRESS NOTES
Cardiology clinic note  Joshua Vyas MD, PhD  Subjective:     Encounter Date:05/17/2021      Patient ID: Shannan Galindo is a 36 y.o. female.    Chief Complaint:  Chief Complaint   Patient presents with   • Follow-up       HPI:  History of Present Illness  Previously I had the pleasure to see this very pleasant 36-year-old female with nonischemic cardiomyopathy EF of 15 to 20%.  She was hospitalized with severe hypertension which was controlled and she was placed on goal-directed medical therapy.  Left heart catheterization revealed no stricture significant obstructive coronary disease and high filling pressures.  She has a global cardiomyopathy in nature with underlying comorbidities of tobacco abuse, obesity essential hypertension.  She was previously on lisinopril 10 mg twice daily in combination with Aldactone 50 daily and Coreg 25 twice daily.  She remains on Coreg and we will ultimately switch her to Entresto 24/26 twice daily and stop her lisinopril 48 hours and then start her Entresto.  She has had some low blood pressures at home and is stopped her hydralazine altogether which we agree with.  We would rather be her be on Entresto Aldactone and Coreg in combination with her Lasix for volume reduction as optimal goal-directed medical therapy.  We did discuss referral to [facility] heart failure clinic for establishment of care given severe cardiomyopathy and young age.    She presents back to clinic today with 20 pounds of intentional weight loss, her heart rate is come down to the low 80s upper 70s on Coreg twice daily which she is requesting refills for.  Entresto is on board to which she is compliant blood pressure is actually better today at 1 30-1 40 systolic able to be uptitrated on Entresto to better dosage, she continues on Aldactone, her fluid is up-and-down but she says she takes extra Lasix as needed for any peripheral edema.  She denies anginal chest pain syncope palpitation ICD shocks  as she has LifeVest presently.  We had an extensive conversation about EF improvement over the next 60 days to try to avoid ICD placement for primary prevention of sudden cardiac death with underlying severe nonischemic cardiomyopathy as well as maintaining a better blood pressure.  We want her blood pressure less than 135 systolic when she voiced understanding.  She is asking for referral back to nephrology which I agree with and provided referral today.     Review of systems negative x14 point review of systems except as mentioned above     Historical data copied forward from previous encounters numerous unchanged     The following portions of the patient's history were reviewed and updated as appropriate: allergies, current medications, past family history, past medical history, past social history, past surgical history and problem list.    Problem List:  Patient Active Problem List   Diagnosis   • HTN (hypertension)   • Anemia   • Morbid obesity (CMS/HCC)   • HLD (hyperlipidemia)   • Tobacco abuse       Past Medical History:  History reviewed. No pertinent past medical history.    Past Surgical History:  Past Surgical History:   Procedure Laterality Date   • CARDIAC CATHETERIZATION N/A 2021    Procedure: Left Heart Cath possible PCI, atherectomy, hemodynamic support;  Surgeon: Joshua Vyas MD;  Location: [facility] CATH INVASIVE LOCATION;  Service: Cardiology;  Laterality: N/A;   • WISDOM TOOTH EXTRACTION         Social History:  Social History     Socioeconomic History   • Marital status: Single     Spouse name: Not on file   • Number of children: Not on file   • Years of education: Not on file   • Highest education level: Not on file   Tobacco Use   • Smoking status: Former Smoker     Packs/day: 0.50     Quit date: 2021     Years since quittin.0   • Smokeless tobacco: Never Used   Vaping Use   • Vaping Use: Never used   Substance and Sexual Activity   • Alcohol use: Not Currently      "Alcohol/week: 1.0 standard drinks     Types: 1 Glasses of wine per week     Comment: Occ.   • Drug use: Never   • Sexual activity: Defer       Allergies:  No Known Allergies    Immunizations:    There is no immunization history on file for this patient.    ROS:  ROS       Objective:         /98 (BP Location: Left arm, Patient Position: Sitting)   Pulse 81   Resp 18   Ht 165.1 cm (65\")   Wt 101 kg (222 lb)   BMI 36.94 kg/m²     Physical Exam  Regular rate and rhythm no rubs gallops no heave no lift  1/6 systolic ejection murmur lower sternal border  No clubbing cyanosis with trace edema only  No pulsatile liver  Abdomen soft nontender nondistended  Clear to auscultation bilaterally  Normocephalic atraumatic pupils equal round minimal JVD  Neurological grossly intact bilateral    In-Office Procedure(s):  Procedures    ASCVD RIsk Score::  The ASCVD Risk score (Nadya SMITH Jr., et al., 2013) failed to calculate for the following reasons:    The 2013 ASCVD risk score is only valid for ages 40 to 79    Recent Radiology:  Imaging Results (Most Recent)     None          Lab Review:   No results displayed because visit has over 200 results.                   Assessment:          Diagnosis Plan   1. Essential hypertension  Basic Metabolic Panel          Plan:        systolic heart failure / volume overload / NICM  - LVEF 18% at [place], will repeat 2D ECHO 4/16 here showed EF = 15-20% with moderate RV dysfunction, mild MR/TR, mild pulmonary hypertension, grade 3 diastolic dysfunction.  -Continue Coreg 25 twice daily  Continue Aldactone 12.5  Increase Entresto to 49/51 twice daily  Hydralazine will be used for systolic greater than 160 as needed  Continue Lasix twice daily  Reecho in 60 days  Continue LifeVest        2.  Stage II essential hypertension  -Coreg, Entresto, Lasix, Aldactone, as needed hydralazine as above     3. Anemia  -Stable     4. Obesity, diet and exercise per AHA guidelines     5. Likely COOPER, sleep " study recommended     6. Tobacco abuse, cessation discussed, she is been off cigarettes since her hospitalization     7. Subclinical hypothyroidism  - internal medicine following     8. CKD  - Cr stable  - nephrology following  Last creatinine 1.2, potassium 4.6, she has decreased her Aldactone recently and she is not on further replacement     Come back to clinic in 30 days  Refer back to nephrology       We will ultimately referred to  for establishment of care heart failure clinic     Joshua Vyas MD, PhD  greater 25 minutes with face-to-face with the patient as well as in coordination of care, med rec review with all risk and benefits and necessity of each medicine discussed, all questions answered proposed patient, discussed LifeVest, discussed prevention for sudden cardiac death, discussed ICD indications, discussed diet and exercise.          Level of Care:                 Joshua Vyas MD  05/17/21  .

## 2021-05-17 NOTE — TELEPHONE ENCOUNTER
Per the patients instructions:   Please fax lab orders (once complete) to patients new PCP - [facility] in [facility]. Patient has a new patient appointment with Coral Pinedo NP.     P: [phone]  F: [phone]    Daniel Grey

## 2021-06-21 ENCOUNTER — OFFICE VISIT (OUTPATIENT)
Dept: CARDIOLOGY | Facility: CLINIC | Age: 36
End: 2021-06-21

## 2021-06-21 VITALS
WEIGHT: 222 LBS | SYSTOLIC BLOOD PRESSURE: 126 MMHG | BODY MASS INDEX: 36.99 KG/M2 | RESPIRATION RATE: 18 BRPM | HEART RATE: 55 BPM | DIASTOLIC BLOOD PRESSURE: 78 MMHG | HEIGHT: 65 IN

## 2021-06-21 DIAGNOSIS — I50.21 ACUTE SYSTOLIC CHF (CONGESTIVE HEART FAILURE) (HCC): Primary | ICD-10-CM

## 2021-06-21 PROCEDURE — 99214 OFFICE O/P EST MOD 30 MIN: CPT | Performed by: INTERNAL MEDICINE

## 2021-06-21 RX ORDER — CARVEDILOL 12.5 MG/1
12.5 TABLET ORAL 2 TIMES DAILY WITH MEALS
Qty: 60 TABLET | Refills: 5 | Status: SHIPPED | OUTPATIENT
Start: 2021-06-21 | End: 2021-08-12 | Stop reason: DRUGHIGH

## 2021-06-21 RX ORDER — SACUBITRIL AND VALSARTAN 49; 51 MG/1; MG/1
2 TABLET, FILM COATED ORAL 2 TIMES DAILY
Qty: 120 TABLET | Refills: 2 | Status: SHIPPED | OUTPATIENT
Start: 2021-06-21 | End: 2021-10-08 | Stop reason: SDUPTHER

## 2021-06-21 RX ORDER — MELATONIN
1000 DAILY
COMMUNITY

## 2021-06-21 NOTE — PROGRESS NOTES
Cardiology clinic note  Joshua Vyas MD, PhD  Subjective:     Encounter Date:06/21/2021      Patient ID: Shannan Galindo is a 36 y.o. female.    Chief Complaint:  Chief Complaint   Patient presents with   • Follow-up       HPI:  History of Present Illness  Previously I had the pleasure to see this very pleasant 36-year-old female with nonischemic cardiomyopathy EF of 15 to 20%.  She was hospitalized with severe hypertension which she was placed on goal-directed medical therapy.  Left heart catheterization revealed no  significant obstructive coronary disease and high filling pressures.  She has a global cardiomyopathy in nature with underlying comorbidities of tobacco abuse, obesity and severely uncontrolled essential hypertension.  She was previously on lisinopril 10 mg twice daily in combination with Aldactone 50 daily and Coreg 25 twice daily.  She remains on Coreg and she was changed to Entresto 49/51 twice daily off hydralazine but remains on spironolactone given heart failure.  She is tolerating loop diuretics as well and appears mostly euvolemic but does have some very trace pitting edema peripherally left greater than right.  She overall she says she is exercising daily feels much improved her heart rate is come down and she is having some heart rates in the 40s she says even while awake but she does not have chronotropic incompetence.  We did discuss is likely too low we can decrease her Coreg to 12.5 twice daily while also increasing further titration of her Entresto up to goal max dose.  Her next 2D echo likely be in 30 to 60 days to recheck EF after 3 months of goal-directed medical therapy.  We did discuss referral to / [facility] heart failure clinic for establishment of care given severe cardiomyopathy and young age.  She denies anginal chest pain syncope palpitation ICD shocks as she has LifeVest . We had an extensive conversation about EF improvement over the next 30-60 days to try to  avoid ICD placement for primary prevention of sudden cardiac death with underlying severe nonischemic cardiomyopathy as well as maintaining a better blood pressure.  We want her blood pressure less than 135 systolic when she voiced understanding.    She recently saw nephrology, last creatinine was 1.1.     Review of systems negative x14 point review of systems except as mentioned above     Historical data copied forward from previous encounters numerous unchanged    Compensated today  Overall feels better  Continue present therapy with titration of Entresto upward to goal dose with decreasing Coreg given low heart rates and bradycardia    The following portions of the patient's history were reviewed and updated as appropriate: allergies, current medications, past family history, past medical history, past social history, past surgical history and problem list.    Problem List:  Patient Active Problem List   Diagnosis   • HTN (hypertension)   • Anemia   • Morbid obesity (CMS/HCC)   • HLD (hyperlipidemia)   • Tobacco abuse       Past Medical History:  No past medical history on file.    Past Surgical History:  Past Surgical History:   Procedure Laterality Date   • CARDIAC CATHETERIZATION N/A 2021    Procedure: Left Heart Cath possible PCI, atherectomy, hemodynamic support;  Surgeon: Joshua Vyas MD;  Location: [facility] CATH INVASIVE LOCATION;  Service: Cardiology;  Laterality: N/A;   • WISDOM TOOTH EXTRACTION         Social History:  Social History     Socioeconomic History   • Marital status: Single     Spouse name: Not on file   • Number of children: Not on file   • Years of education: Not on file   • Highest education level: Not on file   Tobacco Use   • Smoking status: Former Smoker     Packs/day: 0.50     Quit date: 2021     Years since quittin.1   • Smokeless tobacco: Never Used   Vaping Use   • Vaping Use: Never used   Substance and Sexual Activity   • Alcohol use: Not Currently      "Alcohol/week: 1.0 standard drinks     Types: 1 Glasses of wine per week     Comment: Occ.   • Drug use: Never   • Sexual activity: Defer       Allergies:  No Known Allergies    Immunizations:    There is no immunization history on file for this patient.    ROS:  ROS       Objective:         /78 (BP Location: Left arm, Patient Position: Sitting)   Pulse 55   Resp 18   Ht 165.1 cm (65\")   Wt 101 kg (222 lb)   BMI 36.94 kg/m²     Physical Exam    In-Office Procedure(s):  Procedures    ASCVD RIsk Score::  The ASCVD Risk score (Ndaya SMITH Jr., et al., 2013) failed to calculate for the following reasons:    The 2013 ASCVD risk score is only valid for ages 40 to 79    Recent Radiology:  Imaging Results (Most Recent)     None          Lab Review:   No results displayed because visit has over 200 results.                   Assessment:          Diagnosis Plan   1. Acute systolic CHF (congestive heart failure) (CMS/HCC)  Adult Transthoracic Echo Complete W/ Cont if Necessary Per Protocol          Plan:      systolic heart failure / volume overload / NICM  - LVEF 18% at [place], will repeat 2D ECHO 4/16 here showed EF = 15-20% with moderate RV dysfunction, mild MR/TR, mild pulmonary hypertension, grade 3 diastolic dysfunction.  -Decrease Coreg to 12.5 twice daily given bradycardia  Continue Aldactone  25 daily  Increase Entresto to 49/51 2 tablets twice daily which is max dose, if not tolerated she can go back to 1 tablet daily, we will make refills at the next higher dose  Off hydralazine  Continue Lasix twice daily  Reecho in 30 days, first week of August  Continue LifeVest        2.  Stage II essential hypertension  -Coreg, Entresto, Lasix, Aldactone, as needed hydralazine as above     3. Anemia  -Stable     4. Obesity, diet and exercise per AHA guidelines     5. Likely COOPER, sleep study recommended     6. Tobacco abuse, cessation discussed, she is been off cigarettes since her hospitalization     7. Subclinical " hypothyroidism  - internal medicine following     8. CKD  - Cr stable  - nephrology following  Last creatinine 1.1, stable  Come back to clinic in 30 days  Refer back to nephrology        We will ultimately referred to  for establishment of care heart failure clinic     Joshua Vyas MD, PhD  greater 25 minutes with face-to-face with the patient as well as in coordination of care, med rec review with all risk and benefits and necessity of each medicine discussed, all questions answered proposed patient, discussed LifeVest, discussed prevention for sudden cardiac death, discussed ICD indications, discussed diet and exercise, discussion of ongoing treatment of prior decompensated systolic heart failure as well as goal-directed medical therapy and titration of medicines as needed and allowed by hemodynamics.            Level of Care:                 Joshua Vyas MD  06/21/21  .

## 2021-08-02 ENCOUNTER — HOSPITAL ENCOUNTER (OUTPATIENT)
Dept: CARDIOLOGY | Facility: HOSPITAL | Age: 36
Discharge: HOME | End: 2021-08-02
Admitting: INTERNAL MEDICINE

## 2021-08-02 VITALS
BODY MASS INDEX: 36.99 KG/M2 | WEIGHT: 222 LBS | SYSTOLIC BLOOD PRESSURE: 154 MMHG | HEIGHT: 65 IN | DIASTOLIC BLOOD PRESSURE: 90 MMHG

## 2021-08-02 DIAGNOSIS — I50.21 ACUTE SYSTOLIC CHF (CONGESTIVE HEART FAILURE) (HCC): ICD-10-CM

## 2021-08-02 PROCEDURE — 93306 TTE W/DOPPLER COMPLETE: CPT | Performed by: INTERNAL MEDICINE

## 2021-08-02 PROCEDURE — 93306 TTE W/DOPPLER COMPLETE: CPT

## 2021-08-04 LAB
BH CV ECHO MEAS - ACS: 1.9 CM
BH CV ECHO MEAS - AO MAX PG (FULL): 11.4 MMHG
BH CV ECHO MEAS - AO MAX PG: 18.2 MMHG
BH CV ECHO MEAS - AO MEAN PG (FULL): 5.1 MMHG
BH CV ECHO MEAS - AO MEAN PG: 8.1 MMHG
BH CV ECHO MEAS - AO ROOT AREA (BSA CORRECTED): 1.5
BH CV ECHO MEAS - AO ROOT AREA: 7.2 CM^2
BH CV ECHO MEAS - AO ROOT DIAM: 3 CM
BH CV ECHO MEAS - AO V2 MAX: 213.3 CM/SEC
BH CV ECHO MEAS - AO V2 MEAN: 130.9 CM/SEC
BH CV ECHO MEAS - AO V2 VTI: 40 CM
BH CV ECHO MEAS - AORTIC HR: 52.9 BPM
BH CV ECHO MEAS - AORTIC R-R: 1.1 SEC
BH CV ECHO MEAS - ASC AORTA: 3.7 CM
BH CV ECHO MEAS - AVA(I,A): 2.3 CM^2
BH CV ECHO MEAS - AVA(I,D): 2.3 CM^2
BH CV ECHO MEAS - AVA(V,A): 2.2 CM^2
BH CV ECHO MEAS - AVA(V,D): 2.2 CM^2
BH CV ECHO MEAS - BSA(HAYCOCK): 2.2 M^2
BH CV ECHO MEAS - BSA: 2.1 M^2
BH CV ECHO MEAS - BZI_BMI: 36.9 KILOGRAMS/M^2
BH CV ECHO MEAS - BZI_METRIC_HEIGHT: 165.1 CM
BH CV ECHO MEAS - BZI_METRIC_WEIGHT: 100.7 KG
BH CV ECHO MEAS - CI(AO): 7.4 L/MIN/M^2
BH CV ECHO MEAS - CI(LVOT): 2.3 L/MIN/M^2
BH CV ECHO MEAS - CO(AO): 15.3 L/MIN
BH CV ECHO MEAS - CO(LVOT): 4.8 L/MIN
BH CV ECHO MEAS - EDV(CUBED): 129.6 ML
BH CV ECHO MEAS - EDV(MOD-SP2): 120.5 ML
BH CV ECHO MEAS - EDV(MOD-SP4): 103.8 ML
BH CV ECHO MEAS - EDV(TEICH): 121.6 ML
BH CV ECHO MEAS - EF(CUBED): 69.6 %
BH CV ECHO MEAS - EF(MOD-BP): 61 %
BH CV ECHO MEAS - EF(MOD-SP2): 62.5 %
BH CV ECHO MEAS - EF(MOD-SP4): 58.3 %
BH CV ECHO MEAS - EF(TEICH): 60.9 %
BH CV ECHO MEAS - ESV(CUBED): 39.4 ML
BH CV ECHO MEAS - ESV(MOD-SP2): 45.1 ML
BH CV ECHO MEAS - ESV(MOD-SP4): 43.2 ML
BH CV ECHO MEAS - ESV(TEICH): 47.6 ML
BH CV ECHO MEAS - FS: 32.7 %
BH CV ECHO MEAS - IVS/LVPW: 0.99
BH CV ECHO MEAS - IVSD: 1.5 CM
BH CV ECHO MEAS - LA DIMENSION(2D): 3.7 CM
BH CV ECHO MEAS - LA DIMENSION: 4.2 CM
BH CV ECHO MEAS - LA/AO: 1.4
BH CV ECHO MEAS - LV DIASTOLIC VOL/BSA (35-75): 50.2 ML/M^2
BH CV ECHO MEAS - LV MASS(C)D: 344.8 GRAMS
BH CV ECHO MEAS - LV MASS(C)DI: 166.7 GRAMS/M^2
BH CV ECHO MEAS - LV MAX PG: 6.8 MMHG
BH CV ECHO MEAS - LV MEAN PG: 3 MMHG
BH CV ECHO MEAS - LV SYSTOLIC VOL/BSA (12-30): 20.9 ML/M^2
BH CV ECHO MEAS - LV V1 MAX: 130.1 CM/SEC
BH CV ECHO MEAS - LV V1 MEAN: 76.6 CM/SEC
BH CV ECHO MEAS - LV V1 VTI: 24.9 CM
BH CV ECHO MEAS - LVIDD: 5.1 CM
BH CV ECHO MEAS - LVIDS: 3.4 CM
BH CV ECHO MEAS - LVOT AREA: 3.6 CM^2
BH CV ECHO MEAS - LVOT DIAM: 2.2 CM
BH CV ECHO MEAS - LVPWD: 1.6 CM
BH CV ECHO MEAS - MV A MAX VEL: 76.8 CM/SEC
BH CV ECHO MEAS - MV DEC SLOPE: 430.5 CM/SEC^2
BH CV ECHO MEAS - MV DEC TIME: 0.21 SEC
BH CV ECHO MEAS - MV E MAX VEL: 91.6 CM/SEC
BH CV ECHO MEAS - MV E/A: 1.2
BH CV ECHO MEAS - MV MAX PG: 5 MMHG
BH CV ECHO MEAS - MV MEAN PG: 1.3 MMHG
BH CV ECHO MEAS - MV V2 MAX: 112.2 CM/SEC
BH CV ECHO MEAS - MV V2 MEAN: 51.6 CM/SEC
BH CV ECHO MEAS - MV V2 VTI: 40.8 CM
BH CV ECHO MEAS - MVA(VTI): 2.2 CM^2
BH CV ECHO MEAS - PA ACC TIME: 0.13 SEC
BH CV ECHO MEAS - PA MAX PG (FULL): 1.8 MMHG
BH CV ECHO MEAS - PA MAX PG: 4.4 MMHG
BH CV ECHO MEAS - PA MEAN PG (FULL): 1.1 MMHG
BH CV ECHO MEAS - PA MEAN PG: 2.1 MMHG
BH CV ECHO MEAS - PA PR(ACCEL): 20.8 MMHG
BH CV ECHO MEAS - PA V2 MAX: 105.2 CM/SEC
BH CV ECHO MEAS - PA V2 MEAN: 66.2 CM/SEC
BH CV ECHO MEAS - PA V2 VTI: 28.2 CM
BH CV ECHO MEAS - PULM DIAS VEL: 50.2 CM/SEC
BH CV ECHO MEAS - PULM S/D: 1.4
BH CV ECHO MEAS - PULM SYS VEL: 69.5 CM/SEC
BH CV ECHO MEAS - RAP SYSTOLE: 3 MMHG
BH CV ECHO MEAS - RV MAX PG: 2.6 MMHG
BH CV ECHO MEAS - RV MEAN PG: 1.1 MMHG
BH CV ECHO MEAS - RV V1 MAX: 80.2 CM/SEC
BH CV ECHO MEAS - RV V1 MEAN: 45.5 CM/SEC
BH CV ECHO MEAS - RV V1 VTI: 17.7 CM
BH CV ECHO MEAS - RVDD: 3.2 CM
BH CV ECHO MEAS - RVSP: 29.7 MMHG
BH CV ECHO MEAS - SI(AO): 139.8 ML/M^2
BH CV ECHO MEAS - SI(CUBED): 43.6 ML/M^2
BH CV ECHO MEAS - SI(LVOT): 43.8 ML/M^2
BH CV ECHO MEAS - SI(MOD-SP2): 36.4 ML/M^2
BH CV ECHO MEAS - SI(MOD-SP4): 29.3 ML/M^2
BH CV ECHO MEAS - SI(TEICH): 35.8 ML/M^2
BH CV ECHO MEAS - SV(AO): 289.2 ML
BH CV ECHO MEAS - SV(CUBED): 90.2 ML
BH CV ECHO MEAS - SV(LVOT): 90.7 ML
BH CV ECHO MEAS - SV(MOD-SP2): 75.3 ML
BH CV ECHO MEAS - SV(MOD-SP4): 60.6 ML
BH CV ECHO MEAS - SV(TEICH): 74.1 ML
BH CV ECHO MEAS - TR MAX VEL: 255.6 CM/SEC

## 2021-08-09 ENCOUNTER — TELEPHONE (OUTPATIENT)
Dept: CARDIOLOGY | Facility: CLINIC | Age: 36
End: 2021-08-09

## 2021-08-10 NOTE — TELEPHONE ENCOUNTER
Spoke with patient regarding results. Per dr quintana, she may d/c her lifevest with recovered EF. She states she hasnt been wearing it for the last 6 weeks anyways.   She does have concerns about her resting HR being in the low 40s. shes had no syncope just some dizziness/lightheadedness at times. She wants to know if her coreg needs adjusted again? Shes currently taking coreg 12.5mg bid.

## 2021-08-12 RX ORDER — CARVEDILOL 6.25 MG/1
6.25 TABLET ORAL 2 TIMES DAILY
Qty: 60 TABLET | Refills: 5 | Status: SHIPPED | OUTPATIENT
Start: 2021-08-12 | End: 2022-03-04 | Stop reason: SDUPTHER

## 2021-08-12 NOTE — TELEPHONE ENCOUNTER
MIRIAM  Pt called stationg she needs a new rx bc the pills are tiny and don't have the score line. Please send to [facility]    Cb# [phone]

## 2021-08-17 ENCOUNTER — TELEMEDICINE (OUTPATIENT)
Dept: CARDIOLOGY | Facility: CLINIC | Age: 36
End: 2021-08-17

## 2021-08-17 VITALS
SYSTOLIC BLOOD PRESSURE: 130 MMHG | WEIGHT: 208.8 LBS | HEIGHT: 65 IN | HEART RATE: 68 BPM | BODY MASS INDEX: 34.79 KG/M2 | DIASTOLIC BLOOD PRESSURE: 84 MMHG

## 2021-08-17 DIAGNOSIS — I50.21 ACUTE SYSTOLIC CHF (CONGESTIVE HEART FAILURE) (HCC): Primary | ICD-10-CM

## 2021-08-17 PROCEDURE — 99213 OFFICE O/P EST LOW 20 MIN: CPT | Performed by: INTERNAL MEDICINE

## 2021-08-17 NOTE — PROGRESS NOTES
Cardiology clinic telehealth note  Joshua Vyas MD, PhD  Subjective:     Encounter Date:08/17/2021      Patient ID: Shannan Galindo is a 36 y.o. female.    Chief Complaint:  Chief Complaint   Patient presents with   • Follow-up   Verbal consent obtained for telehealth visit today  Unable to complete visit using a video connection to the patient. A phone visit was used to complete this visits. Total time of discussion was 14 minutes minutes with additional time and charting for total encounter time greater than 25 minutes.    HPI:  History of Present Illness  Previously I had the pleasure to see this very pleasant 36-year-old female with nonischemic cardiomyopathy EF of 15 to 20%.  She was hospitalized with severe hypertension which she was placed on goal-directed medical therapy.  Left heart catheterization revealed no  significant obstructive coronary disease and high filling pressures.  She has a global cardiomyopathy in nature with underlying comorbidities of tobacco abuse, obesity and severely uncontrolled essential hypertension.  She was previously on lisinopril 10 mg twice daily in combination with Aldactone 50 daily and Coreg 25 twice daily.  She remains on Coreg and she was changed to Entresto 49/51 twice daily off hydralazine but remains on spironolactone given heart failure.  She is tolerating loop diuretics as well and appears mostly euvolemic but does have some very trace pitting edema peripherally left greater than right.  She overall she says she is exercising daily feels much improved her heart rate is come down and she is having some heart rates in the 40s she says even while awake but she does not have chronotropic incompetence.  We did discuss is likely too low we can decrease her Coreg to 12.5 twice daily while also increasing further titration of her Entresto up to goal max dose.  Her next 2D echo likely be in 30 to 60 days to recheck EF after 3 months of goal-directed medical therapy.  We did  discuss referral to / [facility] heart failure clinic for establishment of care given severe cardiomyopathy and young age.    After last clinic visit patient underwent repeat 2D echo with significant recovery of LV systolic function to 55 to 60% with residual grade 2 diastolic dysfunction, she has discontinued her LifeVest.  She is asking questions about her cardiac medicines with respect to continuation versus discontinuation, she is losing weight she is down to about 208 pounds from 220 to 230 pounds which is wonderful.  She has no chest pain, shortness of breath is improving, no heart failure signs or symptoms, blood pressure is much better controlled at 130/84 with heart rate of 68 on present medicines, she is on Entresto at high dose, Aldactone, Lasix twice daily and Coreg twice daily  We did discuss that a certain percentage of patients with nonischemic cardiomyopathy do have recurrence of their heart failure and drop in EF after discontinuation of medicines and this would not be recommended.  I believe that her severely uncontrolled hypertension caused significant limitation in her EF which is likely responsible is now better controlled, her goal weight is 150 she says.  We did discuss that if she continues to lose weight increases her cardiovascular fitness that blood pressure may drop enough that we need to readjust her Entresto downward or stop Aldactone entirely but she will always remain with some degree of cardiovascular medicines.,  CCS class I NYHA class I presently         Review of systems negative x14 point review of systems except as mentioned above     Historical data copied forward from previous encounters numerous unchanged     Compensated today      No exam possible    Assessment plan per my encounter  systolic heart failure / volume overload / NICM  - LVEF 18% at [place], will repeat 2D ECHO 4/16 here showed EF = 15-20% with moderate RV dysfunction, mild MR/TR, mild pulmonary  hypertension, grade 3 diastolic dysfunction.  Repeat echo recently-August 2021 demonstrates EF 55 to 60% fully recovered on heart failure therapy  -Decrease Coreg 6.25 twice daily  Continue Aldactone  25 daily  Continue Entresto to 49/51 2 tablets twice daily which is max dose for now, blood pressure is lower she has dizziness she can decrease this to 1 tablet twice daily  Off hydralazine  Continue Lasix twice daily    Discontinue LifeVest        2.  Stage II essential hypertension  -Coreg, Entresto, Lasix, Aldactone, as needed hydralazine as above  Better controlled    3. Anemia  -Stable     4. Obesity, diet and exercise per AHA guidelines     5. Likely COOPER, sleep study recommended     6. Tobacco abuse, cessation discussed, she is been off cigarettes since her hospitalization     7. Subclinical hypothyroidism  - internal medicine following     8. CKD  - Cr stable  - nephrology following  Last creatinine 1.1, stable  Come back to clinic in 30 days  Refer back to nephrology      Follow-up in 6 months    Joshua Vyas MD, PhD     The following portions of the patient's history were reviewed and updated as appropriate: allergies, current medications, past family history, past medical history, past social history, past surgical history and problem list.    Problem List:  Patient Active Problem List   Diagnosis   • HTN (hypertension)   • Anemia   • Morbid obesity (CMS/HCC)   • HLD (hyperlipidemia)   • Tobacco abuse       Past Medical History:  History reviewed. No pertinent past medical history.    Past Surgical History:  Past Surgical History:   Procedure Laterality Date   • CARDIAC CATHETERIZATION N/A 4/19/2021    Procedure: Left Heart Cath possible PCI, atherectomy, hemodynamic support;  Surgeon: Joshua Vyas MD;  Location: [facility] CATH INVASIVE LOCATION;  Service: Cardiology;  Laterality: N/A;   • WISDOM TOOTH EXTRACTION         Social History:  Social History     Socioeconomic History   • Marital status:  "Single     Spouse name: Not on file   • Number of children: Not on file   • Years of education: Not on file   • Highest education level: Not on file   Tobacco Use   • Smoking status: Former Smoker     Packs/day: 0.50     Quit date: 2021     Years since quittin.3   • Smokeless tobacco: Never Used   Vaping Use   • Vaping Use: Never used   Substance and Sexual Activity   • Alcohol use: Not Currently     Alcohol/week: 1.0 standard drinks     Types: 1 Glasses of wine per week     Comment: Occ.   • Drug use: Never   • Sexual activity: Defer       Allergies:  No Known Allergies    Immunizations:    There is no immunization history on file for this patient.    ROS:  ROS       Objective:         /84   Pulse 68   Ht 165.1 cm (65\")   Wt 94.7 kg (208 lb 12.8 oz)   BMI 34.75 kg/m²     Physical Exam    In-Office Procedure(s):  Procedures    ASCVD RIsk Score::  The ASCVD Risk score (Nadya SMITH Jr., et al., 2013) failed to calculate for the following reasons:    The 2013 ASCVD risk score is only valid for ages 40 to 79    Recent Radiology:  Imaging Results (Most Recent)     None          Lab Review:   Hospital Outpatient Visit on 2021   Component Date Value   • BSA 2021 2.1    • RVIDd 2021 3.2    • IVSd 2021 1.5    • LVIDd 2021 5.1    • LVIDs 2021 3.4    • LVPWd 2021 1.6    • IVS/LVPW 2021 0.99    • FS 2021 32.7    • EDV(Teich) 2021 121.6    • ESV(Teich) 2021 47.6    • EF(Teich) 2021 60.9    • EDV(cubed) 2021 129.6    • ESV(cubed) 2021 39.4    • EF(cubed) 2021 69.6    • LV mass(C)d 2021 344.8    • LV mass(C)dI 2021 166.7    • SV(Teich) 2021 74.1    • SI(Teich) 2021 35.8    • SV(cubed) 2021 90.2    • SI(cubed) 2021 43.6    • Ao root diam 2021 3.0    • Ao root area 2021 7.2    • ACS 2021 1.9    • LA dimension 2021 4.2    • asc Aorta Diam 2021 3.7    • LA/Ao " 08/02/2021 1.4    • LVOT diam 08/02/2021 2.2    • LVOT area 08/02/2021 3.6    • EDV(MOD-sp4) 08/02/2021 103.8    • ESV(MOD-sp4) 08/02/2021 43.2    • EF(MOD-sp4) 08/02/2021 58.3    • EDV(MOD-sp2) 08/02/2021 120.5    • ESV(MOD-sp2) 08/02/2021 45.1    • EF(MOD-sp2) 08/02/2021 62.5    • SV(MOD-sp4) 08/02/2021 60.6    • SI(MOD-sp4) 08/02/2021 29.3    • SV(MOD-sp2) 08/02/2021 75.3    • SI(MOD-sp2) 08/02/2021 36.4    • Ao root area (BSA correc* 08/02/2021 1.5    • LV Sarkar Vol (BSA correct* 08/02/2021 50.2    • LV Sys Vol (BSA correcte* 08/02/2021 20.9    • Aortic R-R 08/02/2021 1.1    • Aortic HR 08/02/2021 52.9    • MV E max neeraj 08/02/2021 91.6    • MV A max neeraj 08/02/2021 76.8    • MV E/A 08/02/2021 1.2    • MV V2 max 08/02/2021 112.2    • MV max PG 08/02/2021 5.0    • MV V2 mean 08/02/2021 51.6    • MV mean PG 08/02/2021 1.3    • MV V2 VTI 08/02/2021 40.8    • MVA(VTI) 08/02/2021 2.2    • MV dec slope 08/02/2021 430.5    • MV dec time 08/02/2021 0.21    • Ao pk neeraj 08/02/2021 213.3    • Ao max PG 08/02/2021 18.2    • Ao max PG (full) 08/02/2021 11.4    • Ao V2 mean 08/02/2021 130.9    • Ao mean PG 08/02/2021 8.1    • Ao mean PG (full) 08/02/2021 5.1    • Ao V2 VTI 08/02/2021 40.0    • MELVI(I,A) 08/02/2021 2.3    • MELVI(I,D) 08/02/2021 2.3    • MELVI(V,A) 08/02/2021 2.2    • MELVI(V,D) 08/02/2021 2.2    • LV V1 max PG 08/02/2021 6.8    • LV V1 mean PG 08/02/2021 3.0    • LV V1 max 08/02/2021 130.1    • LV V1 mean 08/02/2021 76.6    • LV V1 VTI 08/02/2021 24.9    • CO(Ao) 08/02/2021 15.3    • CI(Ao) 08/02/2021 7.4    • SV(Ao) 08/02/2021 289.2    • SI(Ao) 08/02/2021 139.8    • CO(LVOT) 08/02/2021 4.8    • CI(LVOT) 08/02/2021 2.3    • SV(LVOT) 08/02/2021 90.7    • SI(LVOT) 08/02/2021 43.8    • PA V2 max 08/02/2021 105.2    • PA max PG 08/02/2021 4.4    • PA max PG (full) 08/02/2021 1.8    • PA V2 mean 08/02/2021 66.2    • PA mean PG 08/02/2021 2.1    • PA mean PG (full) 08/02/2021 1.1    • PA V2 VTI 08/02/2021 28.2    • PA acc  time 08/02/2021 0.13    • RV V1 max PG 08/02/2021 2.6    • RV V1 mean PG 08/02/2021 1.1    • RV V1 max 08/02/2021 80.2    • RV V1 mean 08/02/2021 45.5    • RV V1 VTI 08/02/2021 17.7    • TR max lazaro 08/02/2021 255.6    • RVSP(TR) 08/02/2021 29.7    • RAP systole 08/02/2021 3.0    • PA pr(Accel) 08/02/2021 20.8    • Pulm Sys Lazaro 08/02/2021 69.5    • Pulm Sarkar Lazaro 08/02/2021 50.2    • Pulm S/D 08/02/2021 1.4    • BH CV ECHO YVETTE - BZI_BMI 08/02/2021 36.9    •  CV ECHO YVETTE - BSA(HA* 08/02/2021 2.2    •  CV ECHO YVETTE - BZI_ME* 08/02/2021 100.7    •  CV ECHO YVETTE - BZI_ME* 08/02/2021 165.1    • EF(MOD-bp) 08/02/2021 61.0    • LA dimension(2D) 08/02/2021 3.7    No results displayed because visit has over 200 results.        Lab Results   Component Value Date     (L) 04/22/2021    K 4.6 04/22/2021     04/22/2021    CO2 24.0 04/22/2021    BUN 22 (H) 04/22/2021    CREATININE 1.16 (H) 04/22/2021    GLUCOSE 123 (H) 04/22/2021    CALCIUM 9.4 04/22/2021                Assessment:          Diagnosis Plan   1. Acute systolic CHF (congestive heart failure) (CMS/[facility])  Comprehensive Metabolic Panel    Lipid Panel       Joshua Vyas MD  08/17/21  .

## 2021-10-08 ENCOUNTER — TELEPHONE (OUTPATIENT)
Dept: CARDIOLOGY | Facility: CLINIC | Age: 36
End: 2021-10-08

## 2021-10-08 RX ORDER — SACUBITRIL AND VALSARTAN 49; 51 MG/1; MG/1
2 TABLET, FILM COATED ORAL 2 TIMES DAILY
Qty: 120 TABLET | Refills: 2 | Status: SHIPPED | OUTPATIENT
Start: 2021-10-08 | End: 2021-12-29

## 2021-10-08 NOTE — TELEPHONE ENCOUNTER
"[facility]  Pt called needing refills of \"sacubitril-valsartan (Entresto) 49-51 MG tablet' 2 tabs BID (q90 w/ refills) sent to [facility] in [place]    Pt cb# [phone]  "

## 2021-12-29 RX ORDER — SACUBITRIL AND VALSARTAN 49; 51 MG/1; MG/1
TABLET, FILM COATED ORAL
Qty: 120 TABLET | Refills: 2 | Status: SHIPPED | OUTPATIENT
Start: 2021-12-29 | End: 2022-01-03 | Stop reason: SDUPTHER

## 2022-01-03 ENCOUNTER — TELEPHONE (OUTPATIENT)
Dept: CARDIOLOGY | Facility: CLINIC | Age: 37
End: 2022-01-03

## 2022-01-03 RX ORDER — SACUBITRIL AND VALSARTAN 49; 51 MG/1; MG/1
2 TABLET, FILM COATED ORAL 2 TIMES DAILY
Qty: 120 TABLET | Refills: 2 | Status: SHIPPED | OUTPATIENT
Start: 2022-01-03 | End: 2022-07-01

## 2022-02-07 ENCOUNTER — OFFICE VISIT (OUTPATIENT)
Dept: CARDIOLOGY | Facility: CLINIC | Age: 37
End: 2022-02-07

## 2022-02-07 VITALS
RESPIRATION RATE: 18 BRPM | BODY MASS INDEX: 34.82 KG/M2 | HEART RATE: 102 BPM | WEIGHT: 209 LBS | SYSTOLIC BLOOD PRESSURE: 180 MMHG | HEIGHT: 65 IN | DIASTOLIC BLOOD PRESSURE: 98 MMHG

## 2022-02-07 DIAGNOSIS — I10 ESSENTIAL HYPERTENSION: Primary | ICD-10-CM

## 2022-02-07 DIAGNOSIS — I50.21 ACUTE SYSTOLIC CHF (CONGESTIVE HEART FAILURE): ICD-10-CM

## 2022-02-07 DIAGNOSIS — E78.2 MIXED HYPERLIPIDEMIA: ICD-10-CM

## 2022-02-07 DIAGNOSIS — I10 PRIMARY HYPERTENSION: ICD-10-CM

## 2022-02-07 PROCEDURE — 99214 OFFICE O/P EST MOD 30 MIN: CPT | Performed by: INTERNAL MEDICINE

## 2022-02-07 NOTE — PROGRESS NOTES
Cardiology Clinic Note  Joshua Vyas MD, PhD    Subjective:     Encounter Date:02/07/2022      Patient ID: Shannan Galindo is a 37 y.o. female.    Chief Complaint:  Chief Complaint   Patient presents with   • Follow-up       HPI:      History of Present Illness  Previously I had the pleasure to see this very pleasant 36-year-old female with nonischemic cardiomyopathy EF of 15 to 20%.  She was hospitalized with severe hypertension which she was placed on goal-directed medical therapy.  Left heart catheterization revealed no  significant obstructive coronary disease and high filling pressures.  She has a global cardiomyopathy in nature with underlying comorbidities of tobacco abuse, obesity and severely uncontrolled essential hypertension.  She was previously on lisinopril 10 mg twice daily in combination with Aldactone 50 daily and Coreg 25 twice daily.  She remains on Coreg and she was changed to Entresto 49/51 twice daily off hydralazine but remains on spironolactone given heart failure.  She is tolerating loop diuretics as well and appears mostly euvolemic but does have some very trace pitting edema peripherally left greater than right.  She overall she says she is exercising daily feels much improved her heart rate is come down and she is having some heart rates in the 40s she says even while awake but she does not have chronotropic incompetence.  We did discuss is likely too low we can decrease her Coreg to 12.5 twice daily while also increasing further titration of her Entresto up to goal max dose.  Her next 2D echo likely be in 30 to 60 days to recheck EF after 3 months of goal-directed medical therapy.  We did discuss referral to / [facility] heart failure clinic for establishment of care given severe cardiomyopathy and young age.     After last clinic visit patient underwent repeat 2D echo with significant recovery of LV systolic function to 55 to 60% with residual grade 2 diastolic dysfunction,  she has discontinued her LifeVest.  She is asking questions about her cardiac medicines with respect to continuation versus discontinuation, she is losing weight she is down to about 208 pounds from 220 to 230 pounds which is wonderful.  She has no chest pain, shortness of breath is improving, no heart failure signs or symptoms, blood pressure is much better controlled at 130/84 with heart rate of 68 on present medicines, she is on Entresto at high dose, Aldactone, Lasix twice daily and Coreg twice daily.  Since her last encounter we did get follow-up 2D echo which revealed full recovery of her EF 61% on medical therapy.  We did discuss that a certain percentage of patients with nonischemic cardiomyopathy do have recurrence of their heart failure and drop in EF after discontinuation of medicines and this would not be recommended.  I believe that her severely uncontrolled hypertension caused significant limitation in her EF which is likely responsible is now better controlled, her goal weight is 150 she says.          Review of systems negative x14 point review of systems except as mentioned above     Historical data copied forward from previous encounters numerous unchanged     Compensated today        No exam possible     Assessment plan per my encounter  systolic heart failure / volume overload / NICM  - LVEF 18% at [place], [facility] repeat 2D ECHO 4/16 here showed EF = 15-20% with moderate RV dysfunction, mild MR/TR, mild pulmonary hypertension, grade 3 diastolic dysfunction.  Repeat most recently August 2021 revealed fully recovered EF 61% on heart failure therapies.  -Continue Coreg 6.2 5 in the morning and 12 point 5 at night  Continue Aldactone  25 daily, oxygen she can stop this if blood pressure gets too low  Continue Entresto to 49/51 2 tablets twice daily   Off hydralazine  Continue Lasix as indicated to up to twice daily       2.  Stage II essential hypertension  -Coreg, Entresto,  Aldactone, as needed  "hydralazine as above  Better controlled, 1 20-1 30 systolic at home with heart rates in the 60s to 70s     3. Anemia  -Stable     4. Obesity, diet and exercise per AHA guidelines     5. Likely COOPER, sleep study recommended     6. Tobacco abuse, cessation discussed, she is been off cigarettes since her hospitalization     7. Subclinical hypothyroidism  - internal medicine following     8. CKD  - Cr stable  - nephrology following  Last creatinine 1.1, stable          Follow-up in 1 year     Joshua Vyas MD, PhD       Historical data copied forward from previous encounters in EMR including the history, exam, and assessment/plan has been reviewed and is unchanged unless noted otherwise.    Cardiac medicines reviewed with risk, benefits, and necessity of each discussed.    Risk and benefit of cardiac testing reviewed including death heart attack stroke pain bleeding infection need for vascular /cardiovascular surgery were discussed and the patient     Objective:         /98 (BP Location: Left arm, Patient Position: Sitting)   Pulse 102   Resp 18   Ht 165.1 cm (65\")   Wt 94.8 kg (209 lb)   BMI 34.78 kg/m²       The pleasure to be involved in this patient's cardiovascular care.  Please call with any questions or concerns  Joshua Vyas MD, PhD    Most recent EKG as reviewed and interpreted by me:  Procedures     Most recent echo as reviewed and interpreted by me:  Results for orders placed during the hospital encounter of 08/02/21    Adult Transthoracic Echo Complete W/ Cont if Necessary Per Protocol    Interpretation Summary  · Left ventricular systolic function is normal.  · Left ventricular ejection fraction is 60 to 65%  · Left ventricular wall thickness is consistent with mild concentric hypertrophy.  · Left ventricular diastolic function is consistent with (grade II w/high LAP) pseudonormalization.      Most recent stress test as reviewed and interpreted by me:      Most recent cardiac catheterization as " reviewed interpreted by me:  Results for orders placed during the hospital encounter of 04/16/21    Cardiac Catheterization/Vascular Study    Narrative  Asael ALARCON PhD  [facility] cardiology  Date of service 4-19-21    Procedure  Left heart catheterization via right radial approach, coronary angiography, left ventriculography in QUINONEZ position    Indication  Heart failure with reduced ejection fraction, EF less than 35%, new onset    After form consent the patient brought to the catheterization lab sterilely prepped and draped usual fashion exposure the right wrist for right radial access via micropuncture modified Seldinger technique with placement of a 5/6 slender sheath under fluoroscopic guidance which was aspirated and flushed with heparinized saline.  An 035 guidewire was advanced level aortic valve followed by diagnostic JL 3 and JR4 6 Bengali catheters for selective left and right coronary angiography which did not reveal any obstructive coronary artery disease of any significance, mild luminal regularities less than 10%.  The JR4 was used across the aortic valve followed by EDP assessment, pullback assessment the transaortic valve gradient and LV gram by gentle hand-injection of 8 to 10 cc of contrast under low pressure.  All catheters and equipment were then removed and the sheath flushed with heparinized saline.  TR band was placed with immediate hemostasis in the Cath Lab.  The patient tolerated procedure well left the Cath Lab chest pain-free hemodynamically electrically stable alert talking to staff neurologically grossly intact bilaterally, standard cocktail of heparin nitroglycerin and Cardene was utilized through the sheath prior to the case intra-arterially.    Blood loss less than 5 cc  Moderate conscious sedation time of 30 minutes, IV Versed and fentanyl administered by registered nurse with complete ECG pulse oximetry and hemodynamic monitoring throughout the entire the case observed by  me  Complications none      Findings  1.  Opening aortic pressure of 120/87 next  #2 closing pressure unchanged  3.  LVEDP elevated at 24 to 26 mmHg  4.  LV function moderate to severely reduced EF estimated 30 to 35%  5.  No transaortic valve gradient    Angiography  1.  Left main normal giving LAD and codominant circumflex, no angiographic disease  2.  The LAD medium to large caliber giving off a diagonal branch and numerous septal perforators and is coursed to and around the apex, no significant angiographic disease of the LAD with only mild luminal irregularities diffusely less than 2%  3.,  Codominant circumflex with 3 large obtuse marginal branches and an L PLV segment, no angiographic disease of the circumflex distribution  4.  The RCA is a medium caliber vessel with PDA and very small PLV segment distally.  T the RCA is without any angiographic disease, the PDA is small in caliber with diffuse luminal regularities with a 50% lesion is midportion and portion of the vessel less than 1.5 mm in diameter should be treated medically and is not the etiology of her global cardiomyopathy    Conclusions and recommendations  #1 nonischemic cardiomyopathy with normal epicardial coronary anatomy and nonobstructive coronary disease  2.  Maximal goal-directed medical therapy for nonischemic cardiomyopathy with attempt to improve EF over the next 8 to 12 weeks  3.  High LVEDP, continue diuresis as tolerated  4.  Patient be discharged once discharge criteria met with continue goal medical therapy titration as outpatient    Primary prevention goals for CAD    Results communicated the patient after the procedure today    Joshua Vyas MD, PhD    The following portions of the patient's history were reviewed and updated as appropriate: allergies, current medications, past family history, past medical history, past social history, past surgical history and problem list.      ROS:  14 point review of systems negative except as  mentioned above    Current Outpatient Medications:   •  carvedilol (COREG) 6.25 MG tablet, Take 1 tablet by mouth 2 (Two) Times a Day. (Patient taking differently: Take 6.25 mg by mouth 2 (Two) Times a Day. 6.25mg q am , 12.5mg q pm), Disp: 60 tablet, Rfl: 5  •  cholecalciferol (VITAMIN D3) 25 MCG (1000 UT) tablet, Take 1,000 Units by mouth Daily., Disp: , Rfl:   •  furosemide (LASIX) 40 MG tablet, Take 1 tablet by mouth 2 (Two) Times a Day., Disp: 180 tablet, Rfl: 3  •  ibuprofen (ADVIL,MOTRIN) 600 MG tablet, Take 600 mg by mouth Every 6 (Six) Hours As Needed for Mild Pain ., Disp: , Rfl:   •  sacubitril-valsartan (Entresto) 49-51 MG tablet, Take 2 tablets by mouth 2 (Two) Times a Day., Disp: 120 tablet, Rfl: 2  •  spironolactone (ALDACTONE) 25 MG tablet, Take 0.5 tablets by mouth Daily. (Patient taking differently: Take 25 mg by mouth Daily.), Disp: 90 tablet, Rfl: 1    Problem List:  Patient Active Problem List   Diagnosis   • HTN (hypertension)   • Anemia   • Morbid obesity (HCC)   • HLD (hyperlipidemia)   • Tobacco abuse     Past Medical History:  History reviewed. No pertinent past medical history.  Past Surgical History:  Past Surgical History:   Procedure Laterality Date   • CARDIAC CATHETERIZATION N/A 2021    Procedure: Left Heart Cath possible PCI, atherectomy, hemodynamic support;  Surgeon: Joshua Vyas MD;  Location: [facility] INVASIVE LOCATION;  Service: Cardiology;  Laterality: N/A;   • WISDOM TOOTH EXTRACTION       Social History:  Social History     Socioeconomic History   • Marital status: Single   Tobacco Use   • Smoking status: Former Smoker     Packs/day: 0.50     Quit date: 2021     Years since quittin.8   • Smokeless tobacco: Never Used   Vaping Use   • Vaping Use: Never used   Substance and Sexual Activity   • Alcohol use: Not Currently     Alcohol/week: 1.0 standard drink     Types: 1 Glasses of wine per week     Comment: Occ.   • Drug use: Never   • Sexual  activity: Defer     Allergies:  No Known Allergies  Immunizations:    There is no immunization history on file for this patient.         In-Office Procedure(s):  No orders to display        ASCVD RIsk Score::  The ASCVD Risk score ([place] DC Jr., et al., 2013) failed to calculate for the following reasons:    The 2013 ASCVD risk score is only valid for ages 40 to 79    Imaging:         Results for orders placed during the hospital encounter of 04/16/21    US Renal Bilateral    Narrative  Examination:    Date of Exam: 4/16/2021 7:13 PM    Indication: Renal failure, acute.    Comparison: CT chest 04/16/2021    Technique: Grayscale and color Doppler ultrasound evaluation of the  kidneys and urinary bladder was performed    Findings:  The right kidney is about 12 cm in length by 4.5 x 4.5 seen.    The left kidney is about 12 cm in length by 6 x 4 cm.    The renal cortical thickness and echogenicity are within normal. Neither  kidney appears hydronephrotic. There is color flow to each kidney. No  renal mass is identified.    Limited exam of the bladder shows a volume of 97 cc.    Impression  Normal exam.    Electronically Signed By-Ivone Almonte MD On:4/17/2021 1:13 AM  This report was finalized on [id number] by  Ivone Almonte MD.      Results for orders placed during the hospital encounter of 04/16/21    CT Chest Pulmonary Embolism    Narrative  CT CHEST PULMONARY EMBOLISM-    Date of Exam: 4/16/2021 11:52 AM    Indication: PE suspected, low/intermediate prob, positive D-dimer.    Comparison: None available.    Technique: Serial and axial CT images of the chest were obtained  following the uneventful intravenous administration of 100 cc Isovue-370  contrast. Reconstructions in the coronal and sagittal planes were also  performed.  Automated exposure control and iterative reconstruction  methods were used.    FINDINGS:    Some of the images are degraded by respiratory motion. No pulmonary  embolism is seen. There is  mild cardiomegaly particularly involving the  left ventricle. Small left greater than right basilar pleural effusions  are present. Interstitial thickening and hazy groundglass densities in  the lungs are favored to reflect changes of mild interstitial and  alveolar edema, and subsegmental atelectatic changes are present in the  bilateral lower lobes. No dense lung consolidations are identified.    There is reflux of contrast into the hepatic veins and IVC which may  reflect changes of right heart dysfunction. The remainder of included  upper abdominal organs have a normal appearance.    There are no acute or suspicious osseous abnormalities.    Impression  1. No pulmonary embolism.  2. FINDINGS compatible with the appearance of mild interstitial and  alveolar edema with bibasilar atelectasis and small bilateral pleural  effusions.  3. Cardiomegaly particularly involving left ventricle. Reflux of  contrast in the hepatic veins may reflect right heart dysfunction.  Correlate with cardiac history.        Electronically Signed By-Miley Trivedi MD On:4/16/2021 12:30 PM  This report was finalized on [id number] by  Miley Trivedi MD.      Lab Review:   No visits with results within 6 Month(s) from this visit.   Latest known visit with results is:   Hospital Outpatient Visit on 08/02/2021   Component Date Value   • BSA 08/02/2021 2.1    • RVIDd 08/02/2021 3.2    • IVSd 08/02/2021 1.5    • LVIDd 08/02/2021 5.1    • LVIDs 08/02/2021 3.4    • LVPWd 08/02/2021 1.6    • IVS/LVPW 08/02/2021 0.99    • FS 08/02/2021 32.7    • EDV(Teich) 08/02/2021 121.6    • ESV(Teich) 08/02/2021 47.6    • EF(Teich) 08/02/2021 60.9    • EDV(cubed) 08/02/2021 129.6    • ESV(cubed) 08/02/2021 39.4    • EF(cubed) 08/02/2021 69.6    • LV mass(C)d 08/02/2021 344.8    • LV mass(C)dI 08/02/2021 166.7    • SV(Teich) 08/02/2021 74.1    • SI(Teich) 08/02/2021 35.8    • SV(cubed) 08/02/2021 90.2    • SI(cubed) 08/02/2021 43.6    • Ao root diam  08/02/2021 3.0    • Ao root area 08/02/2021 7.2    • ACS 08/02/2021 1.9    • LA dimension 08/02/2021 4.2    • asc Aorta Diam 08/02/2021 3.7    • LA/Ao 08/02/2021 1.4    • LVOT diam 08/02/2021 2.2    • LVOT area 08/02/2021 3.6    • EDV(MOD-sp4) 08/02/2021 103.8    • ESV(MOD-sp4) 08/02/2021 43.2    • EF(MOD-sp4) 08/02/2021 58.3    • EDV(MOD-sp2) 08/02/2021 120.5    • ESV(MOD-sp2) 08/02/2021 45.1    • EF(MOD-sp2) 08/02/2021 62.5    • SV(MOD-sp4) 08/02/2021 60.6    • SI(MOD-sp4) 08/02/2021 29.3    • SV(MOD-sp2) 08/02/2021 75.3    • SI(MOD-sp2) 08/02/2021 36.4    • Ao root area (BSA correc* 08/02/2021 1.5    • LV Sarkar Vol (BSA correct* 08/02/2021 50.2    • LV Sys Vol (BSA correcte* 08/02/2021 20.9    • Aortic R-R 08/02/2021 1.1    • Aortic HR 08/02/2021 52.9    • MV E max neeraj 08/02/2021 91.6    • MV A max neeraj 08/02/2021 76.8    • MV E/A 08/02/2021 1.2    • MV V2 max 08/02/2021 112.2    • MV max PG 08/02/2021 5.0    • MV V2 mean 08/02/2021 51.6    • MV mean PG 08/02/2021 1.3    • MV V2 VTI 08/02/2021 40.8    • MVA(VTI) 08/02/2021 2.2    • MV dec slope 08/02/2021 430.5    • MV dec time 08/02/2021 0.21    • Ao pk neeraj 08/02/2021 213.3    • Ao max PG 08/02/2021 18.2    • Ao max PG (full) 08/02/2021 11.4    • Ao V2 mean 08/02/2021 130.9    • Ao mean PG 08/02/2021 8.1    • Ao mean PG (full) 08/02/2021 5.1    • Ao V2 VTI 08/02/2021 40.0    • MELVI(I,A) 08/02/2021 2.3    • MELVI(I,D) 08/02/2021 2.3    • MELVI(V,A) 08/02/2021 2.2    • MELVI(V,D) 08/02/2021 2.2    • LV V1 max PG 08/02/2021 6.8    • LV V1 mean PG 08/02/2021 3.0    • LV V1 max 08/02/2021 130.1    • LV V1 mean 08/02/2021 76.6    • LV V1 VTI 08/02/2021 24.9    • CO(Ao) 08/02/2021 15.3    • CI(Ao) 08/02/2021 7.4    • SV(Ao) 08/02/2021 289.2    • SI(Ao) 08/02/2021 139.8    • CO(LVOT) 08/02/2021 4.8    • CI(LVOT) 08/02/2021 2.3    • SV(LVOT) 08/02/2021 90.7    • SI(LVOT) 08/02/2021 43.8    • PA V2 max 08/02/2021 105.2    • PA max PG 08/02/2021 4.4    • PA max PG (full) 08/02/2021  1.8    • PA V2 mean 08/02/2021 66.2    • PA mean PG 08/02/2021 2.1    • PA mean PG (full) 08/02/2021 1.1    • PA V2 VTI 08/02/2021 28.2    • PA acc time 08/02/2021 0.13    • RV V1 max PG 08/02/2021 2.6    • RV V1 mean PG 08/02/2021 1.1    • RV V1 max 08/02/2021 80.2    • RV V1 mean 08/02/2021 45.5    • RV V1 VTI 08/02/2021 17.7    • TR max lazaro 08/02/2021 255.6    • RVSP(TR) 08/02/2021 29.7    • RAP systole 08/02/2021 3.0    • PA pr(Accel) 08/02/2021 20.8    • Pulm Sys Lazaro 08/02/2021 69.5    • Pulm Sarkar Lazaro 08/02/2021 50.2    • Pulm S/D 08/02/2021 1.4    •  CV ECHO YVETTE - BZI_BMI 08/02/2021 36.9    •  CV ECHO YVETTE - BSA(HA* 08/02/2021 2.2    •  CV ECHO YVETTE - BZI_ME* 08/02/2021 100.7    •  CV ECHO YVETTE - BZI_ME* 08/02/2021 165.1    • EF(MOD-bp) 08/02/2021 61.0    • LA dimension(2D) 08/02/2021 3.7      Recent labs reviewed and interpreted for clinical significance and application            Level of Care:           Joshua Vyas MD  02/07/22  .

## 2022-03-04 ENCOUNTER — TELEMEDICINE (OUTPATIENT)
Dept: CARDIOLOGY | Facility: CLINIC | Age: 37
End: 2022-03-04

## 2022-03-04 VITALS
HEART RATE: 66 BPM | DIASTOLIC BLOOD PRESSURE: 71 MMHG | HEIGHT: 65 IN | SYSTOLIC BLOOD PRESSURE: 108 MMHG | WEIGHT: 203 LBS | BODY MASS INDEX: 33.82 KG/M2

## 2022-03-04 DIAGNOSIS — I10 PRIMARY HYPERTENSION: ICD-10-CM

## 2022-03-04 DIAGNOSIS — R55 SYNCOPE AND COLLAPSE: ICD-10-CM

## 2022-03-04 DIAGNOSIS — I50.21 ACUTE SYSTOLIC CHF (CONGESTIVE HEART FAILURE): Primary | ICD-10-CM

## 2022-03-04 DIAGNOSIS — E78.2 MIXED HYPERLIPIDEMIA: ICD-10-CM

## 2022-03-04 PROCEDURE — 99214 OFFICE O/P EST MOD 30 MIN: CPT | Performed by: INTERNAL MEDICINE

## 2022-03-04 RX ORDER — CARVEDILOL 6.25 MG/1
6.25 TABLET ORAL 2 TIMES DAILY
Qty: 60 TABLET | Refills: 5 | Status: SHIPPED | OUTPATIENT
Start: 2022-03-04 | End: 2022-08-29

## 2022-03-04 RX ORDER — FUROSEMIDE 40 MG/1
40 TABLET ORAL DAILY
Qty: 30 TABLET | Refills: 5 | Status: SHIPPED | OUTPATIENT
Start: 2022-03-04 | End: 2022-05-11 | Stop reason: SDUPTHER

## 2022-03-04 RX ORDER — TOPIRAMATE 50 MG/1
50 TABLET, FILM COATED ORAL NIGHTLY
COMMUNITY
Start: 2022-03-01 | End: 2022-03-22

## 2022-03-04 RX ORDER — ASPIRIN 81 MG/1
81 TABLET ORAL DAILY
COMMUNITY
Start: 2022-03-01

## 2022-03-04 NOTE — PROGRESS NOTES
Cardiology Clinic Note  Joshua Vyas MD, PhD    Subjective:     Encounter Date:03/04/2022      Patient ID: hSannan Galindo is a 37 y.o. female.    Chief Complaint:  Chief Complaint   Patient presents with   • Hospital Follow Up Visit   Verbal consent for telehealth obtained today  Unable to complete visit using a video connection to the patient. A phone visit was used to complete this visits. Total time of discussion was 16minutes with additional time and charting for total encounter time greater than 20.    HPI:      History of Present Illness  Previously I had the pleasure to see this very pleasant 36-year-old female with nonischemic cardiomyopathy EF of 15 to 20%.  She was hospitalized with severe hypertension which she was placed on goal-directed medical therapy.  Left heart catheterization revealed no  significant obstructive coronary disease and high filling pressures.  She has a global cardiomyopathy in nature with underlying comorbidities of tobacco abuse, obesity and severely uncontrolled essential hypertension.  She was previously on lisinopril 10 mg twice daily in combination with Aldactone 50 daily and Coreg 25 twice daily.  She remains on Coreg and she was changed to Entresto 49/51 twice daily off hydralazine but remains on spironolactone given heart failure.  She is tolerating loop diuretics as well and appears mostly euvolemic but does have some very trace pitting edema peripherally left greater than right.  She overall she says she is exercising daily feels much improved her heart rate is come down and she is having some heart rates in the 40s she says even while awake but she does not have chronotropic incompetence.  We did discuss is likely too low we can decrease her Coreg to 12.5 twice daily while also increasing further titration of her Entresto up to goal max dose.  Her next 2D echo likely be in 30 to 60 days to recheck EF after 3 months of goal-directed medical therapy.  We did discuss  referral to / [facility] heart failure clinic for establishment of care given severe cardiomyopathy and young age.     After last clinic visit patient underwent repeat 2D echo with significant recovery of LV systolic function to 55 to 60% with residual grade 2 diastolic dysfunction, she has discontinued her LifeVest.  She is asking questions about her cardiac medicines with respect to continuation versus discontinuation, she is losing weight she is down to about 208 pounds from 220 to 230 pounds which is wonderful.  She has no chest pain, shortness of breath is improving, no heart failure signs or symptoms, blood pressure is much better controlled at 130/84 with heart rate of 68 on present medicines, she is on Entresto at high dose, Aldactone, Lasix twice daily and Coreg twice daily.  Since her last encounter we did get follow-up 2D echo which revealed full recovery of her EF 61% on medical therapy.  We did discuss that a certain percentage of patients with nonischemic cardiomyopathy do have recurrence of their heart failure and drop in EF after discontinuation of medicines and this would not be recommended.  I believe that her severely uncontrolled hypertension caused significant limitation in her EF which is likely responsible is now better controlled,     We had a telehealth visit today on encounter.  She was recently seen in [facility] for over syncopal episode.  Her EF is still preserved at 61%.  She is still on a host of medicines including Coreg Aldactone and Entresto and Lasix.  She is been an avid runner and has lost a lot of weight in addition her blood pressure has come down substantially and as we discussed previously is likely we do need to withdraw or lessen some of the afterload reduction specifically with the Aldactone, decreasing Coreg with her heart rate in the low 60s with continuation of the Entresto and may be minimizing Lasix to once a day from twice a day.  We discussed this at  length and she is amenable to these changes.  There was some question over whether she had a seizure-like episode and she was ultimately placed on Topamax for now well compensated no edema no chest pain no shortness of breath, normalization of her EF no sign of acute MI        Review of systems negative x14 point review of systems except as mentioned above     Historical data copied forward from previous encounters numerous unchanged     Compensated today        No exam possible     Assessment plan per my encounter  systolic heart failure / volume overload / NICM  - LVEF initially 18% at [place], will repeat 2D ECHO 4/16 here showed EF = 15-20% with moderate RV dysfunction, mild MR/TR, mild pulmonary hypertension, grade 3 diastolic dysfunction.  Repeat most recently August 2021 revealed fully recovered EF 61% on heart failure therapies.  -Decrease Coreg to 6.25 twice daily  Discontinue Aldactone    Continue Entresto to 49/51 2 tablets twice daily   Off hydralazine  Continue Lasix but decrease to once a day  Goal blood pressure simply less than 135 systolic     Essential hypertension  Changes as above  Better controlled, 1 20-1 30 systolic at home with heart rates in the 60s to 70s     3. Anemia  -Stable     4. Obesity, diet and exercise per AHA guidelines     5. Likely COOPER, sleep study recommended     6. Tobacco abuse, cessation discussed, she is been off cigarettes since her remote hospitalization     7. Subclinical hypothyroidism  - internal medicine following     8. CKD  - Cr stable  - nephrology following  Last creatinine 1.1, stable           Follow-up in  30 days via telehealth for reassessment of symptoms  Follow-up heart monitor placed at [place]'s     Joshua Vyas MD, PhD       Historical data copied forward from previous encounters in EMR including the history, exam, and assessment/plan has been reviewed and is unchanged unless noted otherwise.    Cardiac medicines reviewed with risk, benefits, and  "necessity of each discussed.    Risk and benefit of cardiac testing reviewed including death heart attack stroke pain bleeding infection need for vascular /cardiovascular surgery were discussed and the patient     Objective:         /71   Pulse 66   Ht 165.1 cm (65\")   Wt 92.1 kg (203 lb)   BMI 33.78 kg/m²     Physical Exam    Assessment:         There are no diagnoses linked to this encounter.       Plan:              The pleasure to be involved in this patient's cardiovascular care.  Please call with any questions or concerns  Joshua Vyas MD, PhD    Most recent EKG as reviewed and interpreted by me:  Procedures     Most recent echo as reviewed and interpreted by me:  Results for orders placed during the hospital encounter of 08/02/21    Adult Transthoracic Echo Complete W/ Cont if Necessary Per Protocol    Interpretation Summary  · Left ventricular systolic function is normal.  · Left ventricular ejection fraction is 60 to 65%  · Left ventricular wall thickness is consistent with mild concentric hypertrophy.  · Left ventricular diastolic function is consistent with (grade II w/high LAP) pseudonormalization.      Most recent stress test as reviewed and interpreted by me:      Most recent cardiac catheterization as reviewed interpreted by me:  Results for orders placed during the hospital encounter of 04/16/21    Cardiac Catheterization/Vascular Study    Narrative  Asael ALARCON PhD  [facility] cardiology  Date of service 4-19-21    Procedure  Left heart catheterization via right radial approach, coronary angiography, left ventriculography in QUINONEZ position    Indication  Heart failure with reduced ejection fraction, EF less than 35%, new onset    After form consent the patient brought to the catheterization lab sterilely prepped and draped usual fashion exposure the right wrist for right radial access via micropuncture modified Seldinger technique with placement of a 5/6 slender sheath under fluoroscopic " guidance which was aspirated and flushed with heparinized saline.  An 035 guidewire was advanced level aortic valve followed by diagnostic JL 3 and JR4 6 Greek catheters for selective left and right coronary angiography which did not reveal any obstructive coronary artery disease of any significance, mild luminal regularities less than 10%.  The JR4 was used across the aortic valve followed by EDP assessment, pullback assessment the transaortic valve gradient and LV gram by gentle hand-injection of 8 to 10 cc of contrast under low pressure.  All catheters and equipment were then removed and the sheath flushed with heparinized saline.  TR band was placed with immediate hemostasis in the Cath Lab.  The patient tolerated procedure well left the Cath Lab chest pain-free hemodynamically electrically stable alert talking to staff neurologically grossly intact bilaterally, standard cocktail of heparin nitroglycerin and Cardene was utilized through the sheath prior to the case intra-arterially.    Blood loss less than 5 cc  Moderate conscious sedation time of 30 minutes, IV Versed and fentanyl administered by registered nurse with complete ECG pulse oximetry and hemodynamic monitoring throughout the entire the case observed by me  Complications none      Findings  1.  Opening aortic pressure of 120/87 next  #2 closing pressure unchanged  3.  LVEDP elevated at 24 to 26 mmHg  4.  LV function moderate to severely reduced EF estimated 30 to 35%  5.  No transaortic valve gradient    Angiography  1.  Left main normal giving LAD and codominant circumflex, no angiographic disease  2.  The LAD medium to large caliber giving off a diagonal branch and numerous septal perforators and is coursed to and around the apex, no significant angiographic disease of the LAD with only mild luminal irregularities diffusely less than 2%  3.,  Codominant circumflex with 3 large obtuse marginal branches and an L PLV segment, no angiographic disease  of the circumflex distribution  4.  The RCA is a medium caliber vessel with PDA and very small PLV segment distally.  T the RCA is without any angiographic disease, the PDA is small in caliber with diffuse luminal regularities with a 50% lesion is midportion and portion of the vessel less than 1.5 mm in diameter should be treated medically and is not the etiology of her global cardiomyopathy    Conclusions and recommendations  #1 nonischemic cardiomyopathy with normal epicardial coronary anatomy and nonobstructive coronary disease  2.  Maximal goal-directed medical therapy for nonischemic cardiomyopathy with attempt to improve EF over the next 8 to 12 weeks  3.  High LVEDP, continue diuresis as tolerated  4.  Patient be discharged once discharge criteria met with continue goal medical therapy titration as outpatient    Primary prevention goals for CAD    Results communicated the patient after the procedure today    Joshua Vyas MD, PhD    The following portions of the patient's history were reviewed and updated as appropriate: allergies, current medications, past family history, past medical history, past social history, past surgical history and problem list.      ROS:  14 point review of systems negative except as mentioned above    Current Outpatient Medications:   •  aspirin (aspirin) 81 MG EC tablet, Take 81 mg by mouth Daily., Disp: , Rfl:   •  carvedilol (COREG) 6.25 MG tablet, Take 1 tablet by mouth 2 (Two) Times a Day. (Patient taking differently: Take 6.25 mg by mouth 2 (Two) Times a Day. 6.25mg q am , 12.5mg q pm), Disp: 60 tablet, Rfl: 5  •  cholecalciferol (VITAMIN D3) 25 MCG (1000 UT) tablet, Take 1,000 Units by mouth Daily., Disp: , Rfl:   •  furosemide (LASIX) 40 MG tablet, Take 1 tablet by mouth 2 (Two) Times a Day., Disp: 180 tablet, Rfl: 3  •  ibuprofen (ADVIL,MOTRIN) 600 MG tablet, Take 600 mg by mouth Every 6 (Six) Hours As Needed for Mild Pain ., Disp: , Rfl:   •  sacubitril-valsartan  (Entresto) 49-51 MG tablet, Take 2 tablets by mouth 2 (Two) Times a Day., Disp: 120 tablet, Rfl: 2  •  spironolactone (ALDACTONE) 25 MG tablet, Take 0.5 tablets by mouth Daily. (Patient taking differently: Take 25 mg by mouth Daily.), Disp: 90 tablet, Rfl: 1  •  topiramate (TOPAMAX) 50 MG tablet, Take 50 mg by mouth Every Night., Disp: , Rfl:     Problem List:  Patient Active Problem List   Diagnosis   • HTN (hypertension)   • Anemia   • Morbid obesity (HCC)   • HLD (hyperlipidemia)   • Tobacco abuse     Past Medical History:  History reviewed. No pertinent past medical history.  Past Surgical History:  Past Surgical History:   Procedure Laterality Date   • CARDIAC CATHETERIZATION N/A 2021    Procedure: Left Heart Cath possible PCI, atherectomy, hemodynamic support;  Surgeon: Joshua Vyas MD;  Location: [facility] CATH INVASIVE LOCATION;  Service: Cardiology;  Laterality: N/A;   • WISDOM TOOTH EXTRACTION       Social History:  Social History     Socioeconomic History   • Marital status: Single   Tobacco Use   • Smoking status: Former Smoker     Packs/day: 0.50     Quit date: 2021     Years since quittin.8   • Smokeless tobacco: Never Used   Vaping Use   • Vaping Use: Never used   Substance and Sexual Activity   • Alcohol use: Not Currently     Alcohol/week: 1.0 standard drink     Types: 1 Glasses of wine per week     Comment: Occ.   • Drug use: Never   • Sexual activity: Defer     Allergies:  No Known Allergies  Immunizations:  Immunization History   Administered Date(s) Administered   • COVID-19 (PFIZER) PURPLE CAP 2022            In-Office Procedure(s):  No orders to display        ASCVD RIsk Score::  The ASCVD Risk score (Winter SARAH Jr., et al., 2013) failed to calculate for the following reasons:    The 2013 ASCVD risk score is only valid for ages 40 to 79    Imaging:         Results for orders placed during the hospital encounter of 21    US Renal  Bilateral    Narrative  Examination:    Date of Exam: 4/16/2021 7:13 PM    Indication: Renal failure, acute.    Comparison: CT chest 04/16/2021    Technique: Grayscale and color Doppler ultrasound evaluation of the  kidneys and urinary bladder was performed    Findings:  The right kidney is about 12 cm in length by 4.5 x 4.5 seen.    The left kidney is about 12 cm in length by 6 x 4 cm.    The renal cortical thickness and echogenicity are within normal. Neither  kidney appears hydronephrotic. There is color flow to each kidney. No  renal mass is identified.    Limited exam of the bladder shows a volume of 97 cc.    Impression  Normal exam.    Electronically Signed By-Ivone Almonte MD On:4/17/2021 1:13 AM  This report was finalized on [id number] by  Ivone Almonte MD.      Results for orders placed during the hospital encounter of 04/16/21    CT Chest Pulmonary Embolism    Narrative  CT CHEST PULMONARY EMBOLISM-    Date of Exam: 4/16/2021 11:52 AM    Indication: PE suspected, low/intermediate prob, positive D-dimer.    Comparison: None available.    Technique: Serial and axial CT images of the chest were obtained  following the uneventful intravenous administration of 100 cc Isovue-370  contrast. Reconstructions in the coronal and sagittal planes were also  performed.  Automated exposure control and iterative reconstruction  methods were used.    FINDINGS:    Some of the images are degraded by respiratory motion. No pulmonary  embolism is seen. There is mild cardiomegaly particularly involving the  left ventricle. Small left greater than right basilar pleural effusions  are present. Interstitial thickening and hazy groundglass densities in  the lungs are favored to reflect changes of mild interstitial and  alveolar edema, and subsegmental atelectatic changes are present in the  bilateral lower lobes. No dense lung consolidations are identified.    There is reflux of contrast into the hepatic veins and IVC which  may  reflect changes of right heart dysfunction. The remainder of included  upper abdominal organs have a normal appearance.    There are no acute or suspicious osseous abnormalities.    Impression  1. No pulmonary embolism.  2. FINDINGS compatible with the appearance of mild interstitial and  alveolar edema with bibasilar atelectasis and small bilateral pleural  effusions.  3. Cardiomegaly particularly involving left ventricle. Reflux of  contrast in the hepatic veins may reflect right heart dysfunction.  Correlate with cardiac history.        Electronically Signed By-Miley Trivedi MD On:4/16/2021 12:30 PM  This report was finalized on [id number] by  Miley Trivedi MD.      Lab Review:   No visits with results within 6 Month(s) from this visit.   Latest known visit with results is:   Hospital Outpatient Visit on 08/02/2021   Component Date Value   • BSA 08/02/2021 2.1    • RVIDd 08/02/2021 3.2    • IVSd 08/02/2021 1.5    • LVIDd 08/02/2021 5.1    • LVIDs 08/02/2021 3.4    • LVPWd 08/02/2021 1.6    • IVS/LVPW 08/02/2021 0.99    • FS 08/02/2021 32.7    • EDV(Teich) 08/02/2021 121.6    • ESV(Teich) 08/02/2021 47.6    • EF(Teich) 08/02/2021 60.9    • EDV(cubed) 08/02/2021 129.6    • ESV(cubed) 08/02/2021 39.4    • EF(cubed) 08/02/2021 69.6    • LV mass(C)d 08/02/2021 344.8    • LV mass(C)dI 08/02/2021 166.7    • SV(Teich) 08/02/2021 74.1    • SI(Teich) 08/02/2021 35.8    • SV(cubed) 08/02/2021 90.2    • SI(cubed) 08/02/2021 43.6    • Ao root diam 08/02/2021 3.0    • Ao root area 08/02/2021 7.2    • ACS 08/02/2021 1.9    • LA dimension 08/02/2021 4.2    • asc Aorta Diam 08/02/2021 3.7    • LA/Ao 08/02/2021 1.4    • LVOT diam 08/02/2021 2.2    • LVOT area 08/02/2021 3.6    • EDV(MOD-sp4) 08/02/2021 103.8    • ESV(MOD-sp4) 08/02/2021 43.2    • EF(MOD-sp4) 08/02/2021 58.3    • EDV(MOD-sp2) 08/02/2021 120.5    • ESV(MOD-sp2) 08/02/2021 45.1    • EF(MOD-sp2) 08/02/2021 62.5    • SV(MOD-sp4) 08/02/2021 60.6    •  SI(MOD-sp4) 08/02/2021 29.3    • SV(MOD-sp2) 08/02/2021 75.3    • SI(MOD-sp2) 08/02/2021 36.4    • Ao root area (BSA correc* 08/02/2021 1.5    • LV Sarkar Vol (BSA correct* 08/02/2021 50.2    • LV Sys Vol (BSA correcte* 08/02/2021 20.9    • Aortic R-R 08/02/2021 1.1    • Aortic HR 08/02/2021 52.9    • MV E max neeraj 08/02/2021 91.6    • MV A max neeraj 08/02/2021 76.8    • MV E/A 08/02/2021 1.2    • MV V2 max 08/02/2021 112.2    • MV max PG 08/02/2021 5.0    • MV V2 mean 08/02/2021 51.6    • MV mean PG 08/02/2021 1.3    • MV V2 VTI 08/02/2021 40.8    • MVA(VTI) 08/02/2021 2.2    • MV dec slope 08/02/2021 430.5    • MV dec time 08/02/2021 0.21    • Ao pk neeraj 08/02/2021 213.3    • Ao max PG 08/02/2021 18.2    • Ao max PG (full) 08/02/2021 11.4    • Ao V2 mean 08/02/2021 130.9    • Ao mean PG 08/02/2021 8.1    • Ao mean PG (full) 08/02/2021 5.1    • Ao V2 VTI 08/02/2021 40.0    • MELVI(I,A) 08/02/2021 2.3    • MELVI(I,D) 08/02/2021 2.3    • MELVI(V,A) 08/02/2021 2.2    • MELVI(V,D) 08/02/2021 2.2    • LV V1 max PG 08/02/2021 6.8    • LV V1 mean PG 08/02/2021 3.0    • LV V1 max 08/02/2021 130.1    • LV V1 mean 08/02/2021 76.6    • LV V1 VTI 08/02/2021 24.9    • CO(Ao) 08/02/2021 15.3    • CI(Ao) 08/02/2021 7.4    • SV(Ao) 08/02/2021 289.2    • SI(Ao) 08/02/2021 139.8    • CO(LVOT) 08/02/2021 4.8    • CI(LVOT) 08/02/2021 2.3    • SV(LVOT) 08/02/2021 90.7    • SI(LVOT) 08/02/2021 43.8    • PA V2 max 08/02/2021 105.2    • PA max PG 08/02/2021 4.4    • PA max PG (full) 08/02/2021 1.8    • PA V2 mean 08/02/2021 66.2    • PA mean PG 08/02/2021 2.1    • PA mean PG (full) 08/02/2021 1.1    • PA V2 VTI 08/02/2021 28.2    • PA acc time 08/02/2021 0.13    • RV V1 max PG 08/02/2021 2.6    • RV V1 mean PG 08/02/2021 1.1    • RV V1 max 08/02/2021 80.2    • RV V1 mean 08/02/2021 45.5    • RV V1 VTI 08/02/2021 17.7    • TR max neeraj 08/02/2021 255.6    • RVSP(TR) 08/02/2021 29.7    • RAP systole 08/02/2021 3.0    • PA pr(Accel) 08/02/2021 20.8    •  Pulm Sys Lazaro 08/02/2021 69.5    • Pulm Sarkar Lazaro 08/02/2021 50.2    • Pulm S/D 08/02/2021 1.4    •  CV ECHO YVETTE - BZI_BMI 08/02/2021 36.9    •  CV ECHO YVETTE - BSA(HA* 08/02/2021 2.2    •  CV ECHO YVETTE - BZI_ME* 08/02/2021 100.7    •  CV ECHO YVETTE - BZI_ME* 08/02/2021 165.1    • EF(MOD-bp) 08/02/2021 61.0    • LA dimension(2D) 08/02/2021 3.7      Recent labs reviewed and interpreted for clinical significance and application            Level of Care:           Joshua Vyas MD  03/04/22  .

## 2022-03-10 ENCOUNTER — OFFICE VISIT (OUTPATIENT)
Dept: OBSTETRICS AND GYNECOLOGY | Facility: CLINIC | Age: 37
End: 2022-03-10

## 2022-03-10 VITALS
SYSTOLIC BLOOD PRESSURE: 176 MMHG | HEIGHT: 64 IN | BODY MASS INDEX: 35.51 KG/M2 | WEIGHT: 208 LBS | HEART RATE: 88 BPM | DIASTOLIC BLOOD PRESSURE: 102 MMHG

## 2022-03-10 DIAGNOSIS — Z30.09 ENCOUNTER FOR OTHER GENERAL COUNSELING OR ADVICE ON CONTRACEPTION: ICD-10-CM

## 2022-03-10 DIAGNOSIS — Z01.419 ENCOUNTER FOR GYNECOLOGICAL EXAMINATION WITHOUT ABNORMAL FINDING: Primary | ICD-10-CM

## 2022-03-10 PROCEDURE — 2014F MENTAL STATUS ASSESS: CPT | Performed by: OBSTETRICS & GYNECOLOGY

## 2022-03-10 PROCEDURE — G0123 SCREEN CERV/VAG THIN LAYER: HCPCS | Performed by: OBSTETRICS & GYNECOLOGY

## 2022-03-10 PROCEDURE — 99385 PREV VISIT NEW AGE 18-39: CPT | Performed by: OBSTETRICS & GYNECOLOGY

## 2022-03-10 PROCEDURE — 3008F BODY MASS INDEX DOCD: CPT | Performed by: OBSTETRICS & GYNECOLOGY

## 2022-03-10 NOTE — PROGRESS NOTES
"Well Woman Visit    CC: Annual well woman exam       HPI:   37 y.o.No obstetric history on file. Contraception or HRT: Contraception:  None  Menses:   q mon, lasts 5 days  Pain:  Mild, OTC meds control discomfort  Incontinence concerns: No  Hx of abnormal pap:  No  Pt has no complaints today. desires iud.      History: PMHx, Meds, Allergies, PSHx, Social Hx, and POBHx all reviewed and updated.      PHYSICAL EXAM:  BP (!) 176/102   Pulse 88   Ht 162.6 cm (64\")   Wt 94.3 kg (208 lb)   LMP 02/18/2022 (Approximate)   BMI 35.70 kg/m²   General- NAD, alert and oriented, appropriate  Psych- Normal mood, good memory  Neck- No masses, no thyroid enlargement  CV- Regular rhythm, no murnurs  Resp- CTA to bases, no wheezes  Abdomen- Soft, non distended, non tender, no masses    Breast left-  Bilaterally symmetrical, no masses, non tender, no nipple discharge  Breast right- Bilaterally symmetrical, no masses, non tender, no nipple discharge    External genitalia- Normal female, no lesions  Urethra/meatus- Normal, no masses, non tender, no prolapse  Bladder- Normal, no masses, non tender, no prolapse  Vagina- Normal, no atrophy, no lesions, no discharge, no prolapse  Cvx- Normal, no lesions, no discharge, No cervical motion tenderness  Uterus- Normal size, shape & consistency.  Non tender, mobile, & no prolapse  Adnexa- No mass, non tender, Difficult to palpate  Anus/Rectum/Perineum- Not performed    Lymphatic- No palpable neck, axillary, or groin nodes  Ext- No edema, no cyanosis    Skin- No lesions, no rashes, no acanthosis nigricans        ASSESSMENT and PLAN:  WWE and Contraception    Diagnoses and all orders for this visit:    1. Encounter for gynecological examination without abnormal finding (Primary)  -     IGP,rfx Aptima HPV All Pth    2. Encounter for other general counseling or advice on contraception  -     hCG, Quantitative, Pregnancy; Future    discussed r/b/se/insertion of IUDs. Pt desires mirena. No sex x 2 " weeks before appt. Beta day before appt. Also discussed bp-just saw cardiologist and is on medication now.    Counseling:     All BC Options R/B/A/SE/E of each reviewed  Track menses, RTO IF <q21d, >7d long, or heavy    Domestic violence/abuse screen: negative    Depression screen: no SI    Preventative:   BREAST HEALTH- Monthly self breast exam importance and how to reviewed. MMG and/or MRI (prn) reviewed per society guidelines and her individual history. Mammo/MRI screen: Not medically needed.  CERVICAL CANCER Screening- Reviewed current ASCCP guidelines for screening w and wo cotest HPV, age specific.  Screen: Updated today.  COLON CANCER Screening- Reviewed current medical society guidelines and options.  Colonoscopy screen:  Not medically needed.  SEXUAL HEALTH: Declines STD screening.  VACCINATIONS Recommended: Flu annually, Gardisil/HPV vaccine (up to 46yo).  Importance discussed, risk being unvaccinated reviewed.  Questions answered  Smoking status- NON SMOKER.  Importance of avoiding second hand smoke.  Follow up PCP/Specialist PMHx and Labs  Myriad: Does not qualify.      She understands the importance of having any ordered tests to be performed in a timely fashion.  She is encouraged to review her results online and/or contact or office if she has questions.     Follow Up:  Return for mirena insertion after approval.      Greta Garcia, APRN  03/10/2022

## 2022-03-11 ENCOUNTER — TELEPHONE (OUTPATIENT)
Dept: CARDIOLOGY | Facility: CLINIC | Age: 37
End: 2022-03-11

## 2022-03-11 NOTE — TELEPHONE ENCOUNTER
Her blood pressure is running 128/93 You had changed her blood pressure dosage at her last visit  She is going to go back to the original l dosage to bring it back down She would like a call back today

## 2022-03-11 NOTE — TELEPHONE ENCOUNTER
Per dr quintana, blood pressure good and not concerned with bottom number. No changes to meds. Spoke with patient and she will continue to monitor her b/p and let us know if it is consistently above 135 systolic.

## 2022-03-17 LAB
CONV .: NORMAL
CYTOLOGIST CVX/VAG CYTO: NORMAL
CYTOLOGY CVX/VAG DOC CYTO: NORMAL
CYTOLOGY CVX/VAG DOC THIN PREP: NORMAL
DX ICD CODE: NORMAL
HIV 1 & 2 AB SER-IMP: NORMAL
Lab: NORMAL
OTHER STN SPEC: NORMAL
STAT OF ADQ CVX/VAG CYTO-IMP: NORMAL

## 2022-03-22 ENCOUNTER — OFFICE VISIT (OUTPATIENT)
Dept: CARDIOLOGY | Facility: CLINIC | Age: 37
End: 2022-03-22

## 2022-03-22 VITALS
BODY MASS INDEX: 34.61 KG/M2 | SYSTOLIC BLOOD PRESSURE: 133 MMHG | HEIGHT: 65 IN | HEART RATE: 61 BPM | DIASTOLIC BLOOD PRESSURE: 87 MMHG

## 2022-03-22 DIAGNOSIS — E78.2 MIXED HYPERLIPIDEMIA: ICD-10-CM

## 2022-03-22 DIAGNOSIS — R55 SYNCOPE AND COLLAPSE: ICD-10-CM

## 2022-03-22 DIAGNOSIS — I50.21 ACUTE SYSTOLIC CHF (CONGESTIVE HEART FAILURE): Primary | ICD-10-CM

## 2022-03-22 DIAGNOSIS — I10 PRIMARY HYPERTENSION: ICD-10-CM

## 2022-03-22 PROCEDURE — 99214 OFFICE O/P EST MOD 30 MIN: CPT | Performed by: INTERNAL MEDICINE

## 2022-03-22 RX ORDER — AMLODIPINE BESYLATE 2.5 MG/1
2.5 TABLET ORAL DAILY
Qty: 30 TABLET | Refills: 5 | Status: SHIPPED | OUTPATIENT
Start: 2022-03-22 | End: 2022-04-04 | Stop reason: SDUPTHER

## 2022-03-22 NOTE — PROGRESS NOTES
Cardiology Clinic Note  Joshua Vyas MD, PhD    Subjective:     Encounter Date:03/22/2022      Patient ID: Shannan Galindo is a 37 y.o. female.    Chief Complaint:  Chief Complaint   Patient presents with   • Follow-up       HPI:    Verbal consent for telehealth obtained today  Unable to complete visit using a video connection to the patient. A phone visit was used to complete this visits. Total time of discussion was 16minutes with additional time and charting for total encounter time greater than 20.     HPI:        History of Present Illness  Previously I had the pleasure to see this very pleasant 36-year-old female with nonischemic cardiomyopathy EF of 15 to 20%.  She was hospitalized with severe hypertension which she was placed on goal-directed medical therapy.  Left heart catheterization revealed no  significant obstructive coronary disease and high filling pressures.  She has a global cardiomyopathy in nature with underlying comorbidities of tobacco abuse, obesity and severely uncontrolled essential hypertension.  She was previously on lisinopril 10 mg twice daily in combination with Aldactone 50 daily and Coreg 25 twice daily.  She remains on Coreg and she was changed to Entresto 49/51 twice daily off hydralazine but remains on spironolactone given heart failure.  She is tolerating loop diuretics as well and appears mostly euvolemic but does have some very trace pitting edema peripherally left greater than right.  She overall she says she is exercising daily feels much improved her heart rate is come down and she is having some heart rates in the 40s she says even while awake but she does not have chronotropic incompetence.  We did discuss is likely too low we can decrease her Coreg to 12.5 twice daily while also increasing further titration of her Entresto up to goal max dose.  Her next 2D echo likely be in 30 to 60 days to recheck EF after 3 months of goal-directed medical therapy.  We did discuss  referral to / [facility] heart failure clinic for establishment of care given severe cardiomyopathy and young age.     After last clinic visit patient underwent repeat 2D echo with significant recovery of LV systolic function to 55 to 60% with residual grade 2 diastolic dysfunction, she has discontinued her LifeVest.  She is asking questions about her cardiac medicines with respect to continuation versus discontinuation, she is losing weight she is down to about 208 pounds from 220 to 230 pounds which is wonderful.  She has no chest pain, shortness of breath is improving, no heart failure signs or symptoms, blood pressure is much better controlled at 130/84 with heart rate of 68 on present medicines, she is on Entresto at high dose, Aldactone, Lasix twice daily and Coreg twice daily.  Since her last encounter we did get follow-up 2D echo which revealed full recovery of her EF 61% on medical therapy.  We did discuss that a certain percentage of patients with nonischemic cardiomyopathy do have recurrence of their heart failure and drop in EF after discontinuation of medicines and this would not be recommended.  I believe that her severely uncontrolled hypertension caused significant limitation in her EF which is likely responsible is now better controlled,      We had a telehealth visit today on encounter.  She was recently seen in [facility] for over syncopal episode.  Her EF is still preserved at 61%.  She is still on a host of medicines including Coreg Entresto and Lasix.    She is concerned about her blood pressures which have been trending back up above 135 systolic and she is taking her Aldactone as needed from her prior prescription.  We did discuss that being on Lasix and Aldactone with weight loss and likely resulting volume depletion with recovered EF Aldactone is not indicated.  We can start her on low-dose amlodipine for afterload reduction with normalization of her EF rather than placing her  on second diuretic with recovered EF.  She is amenable for this approach        Review of systems negative x14 point review of systems except as mentioned above     Historical data copied forward from previous encounters numerous unchanged     Compensated today        No exam possible     Assessment plan per my encounter  systolic heart failure / volume overload / NICM  - LVEF initially 18% at [facility], will repeat 2D ECHO 4/16 here showed EF = 15-20% with moderate RV dysfunction, mild MR/TR, mild pulmonary hypertension, grade 3 diastolic dysfunction.  Repeat most recently August 2021 revealed fully recovered EF 61% on heart failure therapies.  -Decrease Coreg to 6.25 twice daily  Discontinue Aldactone    Continue Entresto to 49/51 2 tablets twice daily   Off hydralazine  Continue Lasix but decrease to once a day  Goal blood pressure simply less than 135 systolic     Essential hypertension uncontrolled  Amlodipine 2.5 daily, increase to 5 if not controlled less than 135 systolic  Continue other present medicines including Entresto and Coreg, diuretic       3. Anemia  -Stable     4. Obesity, diet and exercise per AHA guidelines     5. Likely COOPER, sleep study recommended     6. Tobacco abuse, cessation discussed, she is been off cigarettes since her remote hospitalization     7. Subclinical hypothyroidism  - internal medicine following     8. CKD  - Cr stable  - nephrology following  Last creatinine 1.1, stable           Follow-up in  30 days via telehealth for reassessment of symptoms  Follow-up heart monitor placed at [facility]     Joshua Vyas MD, PhD       Historical data copied forward from previous encounters in EMR including the history, exam, and assessment/plan has been reviewed and is unchanged unless noted otherwise.    Cardiac medicines reviewed with risk, benefits, and necessity of each discussed.    Risk and benefit of cardiac testing reviewed including death heart attack stroke pain bleeding infection  "need for vascular /cardiovascular surgery were discussed and the patient     Objective:         /87   Pulse 61   Ht 165.1 cm (65\")   BMI 34.61 kg/m²     Physical Exam    Assessment:         There are no diagnoses linked to this encounter.       Plan:              The pleasure to be involved in this patient's cardiovascular care.  Please call with any questions or concerns  Joshua Vyas MD, PhD    Most recent EKG as reviewed and interpreted by me:  Procedures     Most recent echo as reviewed and interpreted by me:  Results for orders placed during the hospital encounter of 08/02/21    Adult Transthoracic Echo Complete W/ Cont if Necessary Per Protocol    Interpretation Summary  · Left ventricular systolic function is normal.  · Left ventricular ejection fraction is 60 to 65%  · Left ventricular wall thickness is consistent with mild concentric hypertrophy.  · Left ventricular diastolic function is consistent with (grade II w/high LAP) pseudonormalization.      Most recent stress test as reviewed and interpreted by me:      Most recent cardiac catheterization as reviewed interpreted by me:  Results for orders placed during the hospital encounter of 04/16/21    Cardiac Catheterization/Vascular Study    Neida Vyas MD PhD  [facility] cardiology  Date of service 4-19-21    Procedure  Left heart catheterization via right radial approach, coronary angiography, left ventriculography in QUINONEZ position    Indication  Heart failure with reduced ejection fraction, EF less than 35%, new onset    After form consent the patient brought to the catheterization lab sterilely prepped and draped usual fashion exposure the right wrist for right radial access via micropuncture modified Seldinger technique with placement of a 5/6 slender sheath under fluoroscopic guidance which was aspirated and flushed with heparinized saline.  An 035 guidewire was advanced level aortic valve followed by diagnostic JL 3 and JR4 6 [place]ian " catheters for selective left and right coronary angiography which did not reveal any obstructive coronary artery disease of any significance, mild luminal regularities less than 10%.  The JR4 was used across the aortic valve followed by EDP assessment, pullback assessment the transaortic valve gradient and LV gram by gentle hand-injection of 8 to 10 cc of contrast under low pressure.  All catheters and equipment were then removed and the sheath flushed with heparinized saline.  TR band was placed with immediate hemostasis in the Cath Lab.  The patient tolerated procedure well left the Cath Lab chest pain-free hemodynamically electrically stable alert talking to staff neurologically grossly intact bilaterally, standard cocktail of heparin nitroglycerin and Cardene was utilized through the sheath prior to the case intra-arterially.    Blood loss less than 5 cc  Moderate conscious sedation time of 30 minutes, IV Versed and fentanyl administered by registered nurse with complete ECG pulse oximetry and hemodynamic monitoring throughout the entire the case observed by me  Complications none      Findings  1.  Opening aortic pressure of 120/87 next  #2 closing pressure unchanged  3.  LVEDP elevated at 24 to 26 mmHg  4.  LV function moderate to severely reduced EF estimated 30 to 35%  5.  No transaortic valve gradient    Angiography  1.  Left main normal giving LAD and codominant circumflex, no angiographic disease  2.  The LAD medium to large caliber giving off a diagonal branch and numerous septal perforators and is coursed to and around the apex, no significant angiographic disease of the LAD with only mild luminal irregularities diffusely less than 2%  3.,  Codominant circumflex with 3 large obtuse marginal branches and an L PLV segment, no angiographic disease of the circumflex distribution  4.  The RCA is a medium caliber vessel with PDA and very small PLV segment distally.  T the RCA is without any angiographic  disease, the PDA is small in caliber with diffuse luminal regularities with a 50% lesion is midportion and portion of the vessel less than 1.5 mm in diameter should be treated medically and is not the etiology of her global cardiomyopathy    Conclusions and recommendations  #1 nonischemic cardiomyopathy with normal epicardial coronary anatomy and nonobstructive coronary disease  2.  Maximal goal-directed medical therapy for nonischemic cardiomyopathy with attempt to improve EF over the next 8 to 12 weeks  3.  High LVEDP, continue diuresis as tolerated  4.  Patient be discharged once discharge criteria met with continue goal medical therapy titration as outpatient    Primary prevention goals for CAD    Results communicated the patient after the procedure today    Joshua Vyas MD, PhD    The following portions of the patient's history were reviewed and updated as appropriate: allergies, current medications, past family history, past medical history, past social history, past surgical history and problem list.      ROS:  14 point review of systems negative except as mentioned above    Current Outpatient Medications:   •  aspirin 81 MG EC tablet, Take 81 mg by mouth Daily., Disp: , Rfl:   •  carvedilol (COREG) 6.25 MG tablet, Take 1 tablet by mouth 2 (Two) Times a Day., Disp: 60 tablet, Rfl: 5  •  cholecalciferol (VITAMIN D3) 25 MCG (1000 UT) tablet, Take 1,000 Units by mouth Daily., Disp: , Rfl:   •  furosemide (LASIX) 40 MG tablet, Take 1 tablet by mouth Daily., Disp: 30 tablet, Rfl: 5  •  ibuprofen (ADVIL,MOTRIN) 600 MG tablet, Take 600 mg by mouth Every 6 (Six) Hours As Needed for Mild Pain ., Disp: , Rfl:   •  sacubitril-valsartan (Entresto) 49-51 MG tablet, Take 2 tablets by mouth 2 (Two) Times a Day., Disp: 120 tablet, Rfl: 2  •  amLODIPine (NORVASC) 2.5 MG tablet, Take 1 tablet by mouth Daily., Disp: 30 tablet, Rfl: 5  •  levonorgestrel (MIRENA) 20 MCG/24HR IUD, TO BE INSERTED ONE TIME BY PRESCRIBER, ROUTE  INTRAUTERINE, Disp: 1 each, Rfl: 0    Problem List:  Patient Active Problem List   Diagnosis   • HTN (hypertension)   • Anemia   • Morbid obesity (HCC)   • HLD (hyperlipidemia)   • Tobacco abuse   • Acute systolic CHF (congestive heart failure) (HCC)   • Syncope and collapse   • Mixed hyperlipidemia     Past Medical History:  No past medical history on file.  Past Surgical History:  Past Surgical History:   Procedure Laterality Date   • CARDIAC CATHETERIZATION N/A 2021    Procedure: Left Heart Cath possible PCI, atherectomy, hemodynamic support;  Surgeon: Joshua Vyas MD;  Location: [facility] CATH INVASIVE LOCATION;  Service: Cardiology;  Laterality: N/A;   • WISDOM TOOTH EXTRACTION       Social History:  Social History     Socioeconomic History   • Marital status: Single   Tobacco Use   • Smoking status: Former Smoker     Packs/day: 0.50     Quit date: 2021     Years since quittin.9   • Smokeless tobacco: Never Used   Vaping Use   • Vaping Use: Never used   Substance and Sexual Activity   • Alcohol use: Not Currently     Alcohol/week: 1.0 standard drink     Types: 1 Glasses of wine per week     Comment: Occ.   • Drug use: Never   • Sexual activity: Defer     Partners: Male     Birth control/protection: None     Allergies:  No Known Allergies  Immunizations:  Immunization History   Administered Date(s) Administered   • COVID-19 (PFIZER) PURPLE CAP 2022            In-Office Procedure(s):  No orders to display        ASCVD RIsk Score::  The ASCVD Risk score (Yawkeyjaime SMITH Jr., et al., 2013) failed to calculate for the following reasons:    The 2013 ASCVD risk score is only valid for ages 40 to 79    Imaging:         Results for orders placed during the hospital encounter of 21    US Renal Bilateral    Narrative  Examination:    Date of Exam: 2021 7:13 PM    Indication: Renal failure, acute.    Comparison: CT chest 2021    Technique: Grayscale and color Doppler ultrasound  evaluation of the  kidneys and urinary bladder was performed    Findings:  The right kidney is about 12 cm in length by 4.5 x 4.5 seen.    The left kidney is about 12 cm in length by 6 x 4 cm.    The renal cortical thickness and echogenicity are within normal. Neither  kidney appears hydronephrotic. There is color flow to each kidney. No  renal mass is identified.    Limited exam of the bladder shows a volume of 97 cc.    Impression  Normal exam.    Electronically Signed By-Ivone Almonte MD On:4/17/2021 1:13 AM  This report was finalized on [id number] by  Ivone Almonte MD.      Results for orders placed during the hospital encounter of 04/16/21    CT Chest Pulmonary Embolism    Narrative  CT CHEST PULMONARY EMBOLISM-    Date of Exam: 4/16/2021 11:52 AM    Indication: PE suspected, low/intermediate prob, positive D-dimer.    Comparison: None available.    Technique: Serial and axial CT images of the chest were obtained  following the uneventful intravenous administration of 100 cc Isovue-370  contrast. Reconstructions in the coronal and sagittal planes were also  performed.  Automated exposure control and iterative reconstruction  methods were used.    FINDINGS:    Some of the images are degraded by respiratory motion. No pulmonary  embolism is seen. There is mild cardiomegaly particularly involving the  left ventricle. Small left greater than right basilar pleural effusions  are present. Interstitial thickening and hazy groundglass densities in  the lungs are favored to reflect changes of mild interstitial and  alveolar edema, and subsegmental atelectatic changes are present in the  bilateral lower lobes. No dense lung consolidations are identified.    There is reflux of contrast into the hepatic veins and IVC which may  reflect changes of right heart dysfunction. The remainder of included  upper abdominal organs have a normal appearance.    There are no acute or suspicious osseous  abnormalities.    Impression  1. No pulmonary embolism.  2. FINDINGS compatible with the appearance of mild interstitial and  alveolar edema with bibasilar atelectasis and small bilateral pleural  effusions.  3. Cardiomegaly particularly involving left ventricle. Reflux of  contrast in the hepatic veins may reflect right heart dysfunction.  Correlate with cardiac history.        Electronically Signed By-Miley Trivedi MD On:4/16/2021 12:30 PM  This report was finalized on [id number] by  Miley Trivedi MD.      Lab Review:   Office Visit on 03/10/2022   Component Date Value   • Diagnosis 03/10/2022 Comment    • Specimen adequacy: 03/10/2022 Comment    • Clinician Provided ICD-1* 03/10/2022 Comment    • Performed by: 03/10/2022 Comment    • QC reviewed by: 03/10/2022 Comment    • . 03/10/2022 .    • Note: 03/10/2022 Comment    • Method: 03/10/2022 Comment    • Conv .conv 03/10/2022 Comment      Recent labs reviewed and interpreted for clinical significance and application            Level of Care:           Joshua Vyas MD  03/22/22  .

## 2022-04-04 ENCOUNTER — TELEPHONE (OUTPATIENT)
Dept: CARDIOLOGY | Facility: CLINIC | Age: 37
End: 2022-04-04

## 2022-04-04 RX ORDER — AMLODIPINE BESYLATE 2.5 MG/1
2.5 TABLET ORAL 2 TIMES DAILY
Qty: 60 TABLET | Refills: 5 | Status: SHIPPED | OUTPATIENT
Start: 2022-04-04 | End: 2022-09-30

## 2022-04-04 RX ORDER — HYDRALAZINE HYDROCHLORIDE 10 MG/1
10 TABLET, FILM COATED ORAL AS NEEDED
Qty: 30 TABLET | Refills: 1 | Status: SHIPPED | OUTPATIENT
Start: 2022-04-04 | End: 2023-02-07

## 2022-04-04 NOTE — TELEPHONE ENCOUNTER
Spoke with patient her b/p is averaging upper 130s/90s. She didn't have specific readings in front of her at this time. She states dr quintana told her she could take hydralazine prn if b/p was greater than 160 systolic and would like a prescription for this. What mg does she need sent in for the hydralazine?  Also, is it ok to send in amlodipine 2.5mg BID? She states she has had to take the second dose of amlodipine occasionally when b/p is elevated as he instructed her.

## 2022-04-04 NOTE — TELEPHONE ENCOUNTER
The directions on her amlodipine says 1 tablet daily  You verbally told her at her last appointment she can take an extra a day if needed  Her insurance will not pay Says its too early to refill  Needs a new script with new directions   You also told her if her blood pressure went above 160 to take hydralazine   She needs a new script for hydralazine  She was on this last year  Please send both scripts to [facility] in [place]   Can you please call so she knows this has been done She is completely out

## 2022-04-05 ENCOUNTER — LAB (OUTPATIENT)
Dept: OBSTETRICS AND GYNECOLOGY | Facility: CLINIC | Age: 37
End: 2022-04-05

## 2022-04-05 DIAGNOSIS — Z30.09 ENCOUNTER FOR OTHER GENERAL COUNSELING OR ADVICE ON CONTRACEPTION: ICD-10-CM

## 2022-04-05 PROCEDURE — 84702 CHORIONIC GONADOTROPIN TEST: CPT | Performed by: OBSTETRICS & GYNECOLOGY

## 2022-04-05 PROCEDURE — 36415 COLL VENOUS BLD VENIPUNCTURE: CPT | Performed by: OBSTETRICS & GYNECOLOGY

## 2022-04-06 ENCOUNTER — PROCEDURE VISIT (OUTPATIENT)
Dept: OBSTETRICS AND GYNECOLOGY | Facility: CLINIC | Age: 37
End: 2022-04-06

## 2022-04-06 VITALS
SYSTOLIC BLOOD PRESSURE: 162 MMHG | WEIGHT: 214 LBS | DIASTOLIC BLOOD PRESSURE: 94 MMHG | HEART RATE: 70 BPM | BODY MASS INDEX: 35.61 KG/M2

## 2022-04-06 DIAGNOSIS — Z30.430 ENCOUNTER FOR INSERTION OF INTRAUTERINE CONTRACEPTIVE DEVICE (IUD): Primary | ICD-10-CM

## 2022-04-06 LAB — HCG INTACT+B SERPL-ACNC: <0.5 MIU/ML

## 2022-04-06 PROCEDURE — 58300 INSERT INTRAUTERINE DEVICE: CPT | Performed by: OBSTETRICS & GYNECOLOGY

## 2022-04-06 NOTE — PROGRESS NOTES
IUD Placement    Sex in last 2 weeks:  No  Bhcg: negative  Consent signed: Yes    CC: Presents for IUD placement    Procedure was explained in detail.  She understands the potential risks include, but are not limited to, failure (pregnancy), pain, bleeding, uterine perforation, and infection.  Her questions have been answered.      Subjective:  Here for mirena. No c/o voiced.    Objective:  /94   Pulse 70   Wt 97.1 kg (214 lb)   BMI 35.61 kg/m²   General- NAD, alert and oriented, appropriate  Psych- Normal mood, good memory  Abdomen- Soft, non distended, non tender, no masses  External genitalia- Normal, no lesions  Vagina- Normal, no discharge  Uterus- Normal size, shape & consistency.  Non tender, mobile, & no prolapse  Cvx- Normal without lesion or discharge.    Betadine x3.  Tenaculum placed.  Uterus sounded to 7.5cm.    Mirena IUD placed without difficulty.    Strings cut 2cm.    Patient tolerated well.      Assessment and Plan:  Diagnoses and all orders for this visit:    1. Encounter for insertion of intrauterine contraceptive device (IUD) (Primary)  -     levonorgestrel (MIRENA) 20 MCG/24HR IUD          Counseling:  She was counseled on the use of the IUD.  It provides contraception for 5 years (Mirena/Kyleena/Liletta) or 3 years (Jinny) or 10 years (Copper).  Failure is <1%.  It does not protect her from STDs and condoms are recommended.  She has been instructed to check the strings monthly.     PRECAUTIONS-- If she has any fevers >101.5F, and abdominal/pelvic pain, or bleeding > 1pad/2hours, she needs to call our office or on call/ER (after hours/weekend).  She understands the potential risk of pelvic inflammatory disease and the potential for an effect on her future fertility if she does not seek immediate evaluation.  Cramping due to the IUD should be controlled with a OTC reliever/NSAID.  If not, she should call our office.  If she misses a period and has a positive pregnancy test she must  immediately seek medical attention due to the risk of ectopic pregnancy which can be life threatening.  She understands removal can sometimes be difficult and can require surgery.    Follow Up:  Return in about 4 weeks (around 5/4/2022) for post iud check.      JASE Robertson  04/06/2022

## 2022-05-04 ENCOUNTER — TRANSCRIBE ORDERS (OUTPATIENT)
Dept: ULTRASOUND IMAGING | Facility: HOSPITAL | Age: 37
End: 2022-05-04

## 2022-05-04 ENCOUNTER — OFFICE VISIT (OUTPATIENT)
Dept: OBSTETRICS AND GYNECOLOGY | Facility: CLINIC | Age: 37
End: 2022-05-04

## 2022-05-04 VITALS
HEIGHT: 65 IN | WEIGHT: 214 LBS | HEART RATE: 72 BPM | BODY MASS INDEX: 35.65 KG/M2 | DIASTOLIC BLOOD PRESSURE: 78 MMHG | SYSTOLIC BLOOD PRESSURE: 132 MMHG

## 2022-05-04 DIAGNOSIS — N63.0 PAINFUL LUMPY BREASTS: Primary | ICD-10-CM

## 2022-05-04 DIAGNOSIS — N63.0 PAINFUL LUMPY BREASTS: ICD-10-CM

## 2022-05-04 DIAGNOSIS — N64.4 PAINFUL LUMPY BREASTS: ICD-10-CM

## 2022-05-04 DIAGNOSIS — N64.4 PAINFUL LUMPY BREASTS: Primary | ICD-10-CM

## 2022-05-04 DIAGNOSIS — Z30.431 ENCOUNTER FOR ROUTINE CHECKING OF INTRAUTERINE CONTRACEPTIVE DEVICE (IUD): Primary | ICD-10-CM

## 2022-05-04 PROCEDURE — 99213 OFFICE O/P EST LOW 20 MIN: CPT | Performed by: OBSTETRICS & GYNECOLOGY

## 2022-05-04 NOTE — PROGRESS NOTES
"  Post IUD Visit      CC: IUD follow-up    HPI:  Pain/Cramping:  No  Vaginal Bleeding:  Has been bleeding off and on since insertion, light  Pain w sex: No  Feels strings easily:  No    Subjective   Shannan Galindo is a 37 y.o. female, No obstetric history on file., who presents for IUD check follow up.  She had an IUD placed 4 weeks ago.  Her LMP was No LMP recorded..  Since the IUD placement, the patient has not had any unusual complaints. She does report that her breasts feel lumpy and are sometimes tender and painful.    The additional following portions of the patient's history were reviewed and updated as appropriate: allergies, current medications, past family history, past medical history, past social history, past surgical history and problem list.    Review of Systems  All other systems reviewed and are negative.     I have reviewed and agree with the HPI, ROS, and historical information as entered above. Greta Garcia, APRN    Objective   /78   Pulse 72   Ht 165.1 cm (65\")   Wt 97.1 kg (214 lb)   BMI 35.61 kg/m²     Physical Exam  Vitals reviewed.   Chest:   Breasts: Breasts are symmetrical.      Right: Mass present. No swelling, bleeding, inverted nipple, nipple discharge, skin change or tenderness.      Left: Mass present. No swelling, bleeding, inverted nipple, nipple discharge, skin change or tenderness.        Comments: Generalized lumpiness of both breasts.  Genitourinary:     General: Normal vulva.      Vagina: No signs of injury and foreign body. Bleeding (scant) present. No vaginal discharge, erythema, tenderness, lesions or prolapsed vaginal walls.      Cervix: Normal.      Uterus: Normal.       Comments: iud strings present at 2 cm  Skin:     General: Skin is warm and dry.   Neurological:      Mental Status: She is alert and oriented to person, place, and time.         Assessment/Plan       Diagnoses and all orders for this visit:    1. Encounter for routine checking of intrauterine " contraceptive device (IUD) (Primary)    2. Painful lumpy breasts  -     Mammo Diagnostic Digital Tomosynthesis Bilateral With CAD; Future  -     US Breast Bilateral Limited; Future    will check diag mammo and prn u/s. Happy with mirena. Discussed giving it more time to see if the bleeding stops and she desires that plan. Will rto if bleeding persists or becomes heavier or any new concerns.     Plan     Return for imaging f/u prn.      Greta Garcia, APRN  05/04/2022

## 2022-05-11 ENCOUNTER — TELEPHONE (OUTPATIENT)
Dept: CARDIOLOGY | Facility: CLINIC | Age: 37
End: 2022-05-11

## 2022-05-11 RX ORDER — FUROSEMIDE 40 MG/1
40 TABLET ORAL DAILY
Qty: 90 TABLET | Refills: 1 | Status: SHIPPED | OUTPATIENT
Start: 2022-05-11 | End: 2022-10-25

## 2022-06-06 ENCOUNTER — HOSPITAL ENCOUNTER (OUTPATIENT)
Dept: MAMMOGRAPHY | Facility: HOSPITAL | Age: 37
Discharge: HOME | End: 2022-06-06

## 2022-06-06 ENCOUNTER — HOSPITAL ENCOUNTER (OUTPATIENT)
Dept: ULTRASOUND IMAGING | Facility: HOSPITAL | Age: 37
Discharge: HOME | End: 2022-06-06

## 2022-06-06 DIAGNOSIS — N64.4 PAINFUL LUMPY BREASTS: ICD-10-CM

## 2022-06-06 DIAGNOSIS — N63.0 PAINFUL LUMPY BREASTS: ICD-10-CM

## 2022-06-06 PROCEDURE — G0279 TOMOSYNTHESIS, MAMMO: HCPCS

## 2022-06-06 PROCEDURE — 77066 DX MAMMO INCL CAD BI: CPT

## 2022-06-06 PROCEDURE — 76642 ULTRASOUND BREAST LIMITED: CPT

## 2022-06-14 ENCOUNTER — OFFICE VISIT (OUTPATIENT)
Dept: OBSTETRICS AND GYNECOLOGY | Facility: CLINIC | Age: 37
End: 2022-06-14

## 2022-06-14 DIAGNOSIS — N63.0 PAINFUL LUMPY BREASTS: Primary | ICD-10-CM

## 2022-06-14 DIAGNOSIS — N64.4 PAINFUL LUMPY BREASTS: Primary | ICD-10-CM

## 2022-06-14 DIAGNOSIS — N93.9 ABNORMAL UTERINE BLEEDING: ICD-10-CM

## 2022-06-14 PROCEDURE — 99442 PR PHYS/QHP TELEPHONE EVALUATION 11-20 MIN: CPT | Performed by: OBSTETRICS & GYNECOLOGY

## 2022-06-14 NOTE — PROGRESS NOTES
GYN Problem/Follow Up Visit    Chief Complaint   Patient presents with   • Follow-up     Breast imaging and aub with mirena      You have chosen to receive care through a telephone visit. Do you consent to use a telephone visit for your medical care today? Yes     HPI  Shannan Galindo is a 37 y.o. female, No obstetric history on file., who presents for breast imaging f/u and aub with mirena. Had mirena inserted 4/6/22 and had been having light bleeding since insertion. No pelvic pain. Also started having really painful lumpy breasts. Today she states the bleeding has slowed way down. Very light and not occurring everyday. Also states breast tenderness seems better.        Additional OB/GYN History   No LMP recorded.  Current contraception: contraceptive methods: IUD.  Insertion date: 4/22  Desires to: continue contraception  Allergies : Patient has no known allergies.     The additional following portions of the patient's history were reviewed and updated as appropriate: allergies, current medications, past family history, past medical history, past social history, past surgical history and problem list.    Review of Systems    I have reviewed and agree with the HPI, ROS, and historical information as entered above. Greta Garcia, APRN    Objective   There were no vitals taken for this visit.    Physical Exam  Neurological:      Mental Status: She is alert and oriented to person, place, and time.            Assessment and Plan    Diagnoses and all orders for this visit:    1. Painful lumpy breasts (Primary)    2. Abnormal uterine bleeding    reviewed diagnostic mammo and u/s done 6/6/22: benign. Discussed decreasing caffeine and wearing comfortable supportive bra. Also discussed that her breast sx could be r/t the new iud. Pt happy with mirena and desires to continue. rto prn.     Counseling:  She understands the importance of having the above orders performed in a timely fashion.  She is encouraged to review her  results online and/or contact or office if she has questions.     Follow Up:  Return if symptoms worsen or fail to improve.    This visit has been rescheduled as a phone visit to comply with patient safety concerns in accordance with CDC recommendations. Total time of discussion was 11 minutes.      Greta Garcia, JASE  06/14/2022

## 2022-07-01 RX ORDER — SACUBITRIL AND VALSARTAN 49; 51 MG/1; MG/1
TABLET, FILM COATED ORAL
Qty: 120 TABLET | Refills: 2 | Status: SHIPPED | OUTPATIENT
Start: 2022-07-01 | End: 2022-09-27

## 2022-08-29 RX ORDER — CARVEDILOL 6.25 MG/1
TABLET ORAL
Qty: 60 TABLET | Refills: 5 | Status: SHIPPED | OUTPATIENT
Start: 2022-08-29 | End: 2023-02-23

## 2022-09-27 RX ORDER — SACUBITRIL AND VALSARTAN 49; 51 MG/1; MG/1
TABLET, FILM COATED ORAL
Qty: 120 TABLET | Refills: 2 | Status: SHIPPED | OUTPATIENT
Start: 2022-09-27 | End: 2022-12-27

## 2022-09-30 RX ORDER — AMLODIPINE BESYLATE 2.5 MG/1
TABLET ORAL
Qty: 60 TABLET | Refills: 5 | Status: SHIPPED | OUTPATIENT
Start: 2022-09-30

## 2022-10-25 RX ORDER — FUROSEMIDE 40 MG/1
TABLET ORAL
Qty: 30 TABLET | Refills: 5 | Status: SHIPPED | OUTPATIENT
Start: 2022-10-25

## 2022-12-27 RX ORDER — SACUBITRIL AND VALSARTAN 49; 51 MG/1; MG/1
TABLET, FILM COATED ORAL
Qty: 120 TABLET | Refills: 2 | Status: SHIPPED | OUTPATIENT
Start: 2022-12-27 | End: 2023-02-07

## 2023-02-07 ENCOUNTER — TELEPHONE (OUTPATIENT)
Dept: CARDIOLOGY | Facility: CLINIC | Age: 38
End: 2023-02-07

## 2023-02-07 ENCOUNTER — OFFICE VISIT (OUTPATIENT)
Dept: CARDIOLOGY | Facility: CLINIC | Age: 38
End: 2023-02-07
Payer: MEDICAID

## 2023-02-07 VITALS
SYSTOLIC BLOOD PRESSURE: 125 MMHG | WEIGHT: 227 LBS | DIASTOLIC BLOOD PRESSURE: 86 MMHG | HEIGHT: 65 IN | HEART RATE: 100 BPM | BODY MASS INDEX: 37.82 KG/M2 | RESPIRATION RATE: 18 BRPM

## 2023-02-07 DIAGNOSIS — I10 PRIMARY HYPERTENSION: ICD-10-CM

## 2023-02-07 DIAGNOSIS — R55 SYNCOPE AND COLLAPSE: ICD-10-CM

## 2023-02-07 DIAGNOSIS — E78.2 MIXED HYPERLIPIDEMIA: Chronic | ICD-10-CM

## 2023-02-07 DIAGNOSIS — I42.8 NICM (NONISCHEMIC CARDIOMYOPATHY): ICD-10-CM

## 2023-02-07 DIAGNOSIS — Z72.0 TOBACCO ABUSE: Chronic | ICD-10-CM

## 2023-02-07 DIAGNOSIS — I50.21 ACUTE SYSTOLIC CHF (CONGESTIVE HEART FAILURE): ICD-10-CM

## 2023-02-07 DIAGNOSIS — I42.8 NICM (NONISCHEMIC CARDIOMYOPATHY): Primary | ICD-10-CM

## 2023-02-07 PROCEDURE — 93000 ELECTROCARDIOGRAM COMPLETE: CPT | Performed by: INTERNAL MEDICINE

## 2023-02-07 PROCEDURE — 99214 OFFICE O/P EST MOD 30 MIN: CPT | Performed by: INTERNAL MEDICINE

## 2023-02-07 RX ORDER — HYDRALAZINE HYDROCHLORIDE 25 MG/1
25 TABLET, FILM COATED ORAL 3 TIMES DAILY PRN
Qty: 90 TABLET | Refills: 3 | Status: SHIPPED | OUTPATIENT
Start: 2023-02-07

## 2023-02-07 RX ORDER — SACUBITRIL AND VALSARTAN 97; 103 MG/1; MG/1
1 TABLET, FILM COATED ORAL 2 TIMES DAILY
Qty: 60 TABLET | Refills: 11 | Status: SHIPPED | OUTPATIENT
Start: 2023-02-07

## 2023-02-07 NOTE — TELEPHONE ENCOUNTER
Caller: Shannan Galindo    Relationship: Self    Best call back number: [phone]     What is the best time to reach you: ANY    Who are you requesting to speak with (clinical staff, provider,  specific staff member): ANY      What was the call regarding: PT NEEDS ORDERS TO GET LABS FROM PCP DISCUSSED DURING APPT ON 2-7-23    Do you require a callback: YES

## 2023-02-07 NOTE — PROGRESS NOTES
Cardiology Clinic Note  Joshua Vyas MD, PhD    Subjective:     Encounter Date:02/07/2023      Patient ID: Shannan Galindo is a 38 y.o. female.    Chief Complaint:  Chief Complaint   Patient presents with   • Follow-up       HPI:    History of Present Illness  Previously I had the pleasure to see this very pleasant 36-year-old female with nonischemic cardiomyopathy EF of 15 to 20%.  She was hospitalized with severe hypertension which she was placed on goal-directed medical therapy.  Left heart catheterization revealed no  significant obstructive coronary disease and high filling pressures.  She has a global cardiomyopathy in nature with underlying comorbidities of tobacco abuse, obesity and severely uncontrolled essential hypertension.  She was previously on lisinopril 10 mg twice daily in combination with Aldactone 50 daily and Coreg 25 twice daily.  She remains on Coreg and she was changed to Entresto 49/51 twice daily off hydralazine but remains on spironolactone given heart failure.  She is tolerating loop diuretics as well and appears mostly euvolemic but does have some very trace pitting edema peripherally left greater than right.  She overall she says she is exercising daily feels much improved her heart rate is come down and she is having some heart rates in the 40s she says even while awake but she does not have chronotropic incompetence.  We did discuss is likely too low we can decrease her Coreg to 12.5 twice daily while also increasing further titration of her Entresto up to goal max dose.  Her next 2D echo likely be in 30 to 60 days to recheck EF after 3 months of goal-directed medical therapy.  We did discuss referral to / [facility] heart failure clinic for establishment of care given severe cardiomyopathy and young age.     After last clinic visit patient underwent repeat 2D echo with significant recovery of LV systolic function to 55 to 60% with residual grade 2 diastolic dysfunction,  she has discontinued her LifeVest.  She is asking questions about her cardiac medicines with respect to continuation versus discontinuation, she is losing weight she is down to about 208 pounds from 220 to 230 pounds which is wonderful.  She has no chest pain, shortness of breath is improving, no heart failure signs or symptoms, blood pressure is much better controlled at 130/84 with heart rate of 68 on present medicines, she is on Entresto at high dose, Aldactone, Lasix twice daily and Coreg twice daily.  Since her last encounter we did get follow-up 2D echo which revealed full recovery of her EF 61% on medical therapy.  She has syncopal episodes of volume depletion in the past and is now off Aldactone.  Blood pressure well controlled today at home has been 1 20-1 30 systolic although high here with known whitecoat hypertension.  She denies any further syncope, no heart failure, otherwise she says she is doing well exercising regularly            Review of systems negative x14 point review of systems except as mentioned above     Historical data copied forward from previous encounters numerous unchanged     Compensated today        Blood pressure 125/86 at home  Regular 90s no rubs [place] lift       Assessment plan per my encounter  systolic heart failure / volume overload / NICM  - LVEF initially 18% at [facility] medical therapy was pursued, repeat most recently echo August 2021 revealed fully recovered EF 61% on heart failure therapies.  -Continue Coreg 6.25 twice daily, increase to 12.5 if needed  Remain off Aldactone    Change Entresto 97/103, 1 tab twice daily  Off hydralazine, as needed only for anything greater than 140 systolic  Continue Lasix 40 daily  Goal blood pressure simply less than 135 systolic,     Essential hypertension uncontrolled, whitecoat hypertension  Amlodipine 2.5 daily, increase to 5 if not controlled less than 135 systolic  Continue other present medicines including Entresto and  "Coreg, diuretic  As needed hydralazine  Other medicines as above with history of nonischemic cardiomyopathy     3. Anemia  -Stable     4. Obesity, diet and exercise per AHA guidelines     5. Likely COOPER, sleep study recommended     6. Tobacco abuse, cessation discussed, she is been off cigarettes since her remote hospitalization     7. Subclinical hypothyroidism  - internal medicine following     8. CKD  - Cr stable  - nephrology following  Last creatinine 1.1, stable     1 year follow-up       Joshua Vyas MD, PhD       Historical data copied forward from previous encounters in EMR including the history, exam, and assessment/plan has been reviewed and is unchanged unless noted otherwise.    Cardiac medicines reviewed with risk, benefits, and necessity of each discussed.    Risk and benefit of cardiac testing reviewed including death heart attack stroke pain bleeding infection need for vascular /cardiovascular surgery were discussed and the patient     Objective:         /86 Comment: at home avg  Pulse 100   Resp 18   Ht 165.1 cm (65\")   Wt 103 kg (227 lb)   BMI 37.77 kg/m²       The pleasure to be involved in this patient's cardiovascular care.  Please call with any questions or concerns  Joshua Vyas MD, PhD    Most recent EKG as reviewed and interpreted by me:    ECG 12 Lead    Date/Time: 2/7/2023 11:31 AM  Performed by: Joshua Vyas MD  Authorized by: Joshua Vyas MD   Comparison: not compared with previous ECG   Previous ECG: no previous ECG available  Rhythm: sinus tachycardia  Rate: normal  Conduction: conduction normal  QRS axis: normal    Clinical impression: abnormal EKG             Most recent echo as reviewed and interpreted by me:  Results for orders placed during the hospital encounter of 08/02/21    Adult Transthoracic Echo Complete W/ Cont if Necessary Per Protocol    Interpretation Summary  · Left ventricular systolic function is normal.  · Left ventricular " ejection fraction is 60 to 65%  · Left ventricular wall thickness is consistent with mild concentric hypertrophy.  · Left ventricular diastolic function is consistent with (grade II w/high LAP) pseudonormalization.      Most recent stress test as reviewed and interpreted by me:      Most recent cardiac catheterization as reviewed interpreted by me:  Results for orders placed during the hospital encounter of 04/16/21    Cardiac Catheterization/Vascular Study    Neida Vyas MD PhD  [facility] cardiology  Date of service 4-19-21    Procedure  Left heart catheterization via right radial approach, coronary angiography, left ventriculography in QUINONEZ position    Indication  Heart failure with reduced ejection fraction, EF less than 35%, new onset    After form consent the patient brought to the catheterization lab sterilely prepped and draped usual fashion exposure the right wrist for right radial access via micropuncture modified Seldinger technique with placement of a 5/6 slender sheath under fluoroscopic guidance which was aspirated and flushed with heparinized saline.  An 035 guidewire was advanced level aortic valve followed by diagnostic JL 3 and JR4 6 Sinhala catheters for selective left and right coronary angiography which did not reveal any obstructive coronary artery disease of any significance, mild luminal regularities less than 10%.  The JR4 was used across the aortic valve followed by EDP assessment, pullback assessment the transaortic valve gradient and LV gram by gentle hand-injection of 8 to 10 cc of contrast under low pressure.  All catheters and equipment were then removed and the sheath flushed with heparinized saline.  TR band was placed with immediate hemostasis in the Cath Lab.  The patient tolerated procedure well left the Cath Lab chest pain-free hemodynamically electrically stable alert talking to staff neurologically grossly intact bilaterally, standard cocktail of heparin nitroglycerin  and Cardene was utilized through the sheath prior to the case intra-arterially.    Blood loss less than 5 cc  Moderate conscious sedation time of 30 minutes, IV Versed and fentanyl administered by registered nurse with complete ECG pulse oximetry and hemodynamic monitoring throughout the entire the case observed by me  Complications none      Findings  1.  Opening aortic pressure of 120/87 next  #2 closing pressure unchanged  3.  LVEDP elevated at 24 to 26 mmHg  4.  LV function moderate to severely reduced EF estimated 30 to 35%  5.  No transaortic valve gradient    Angiography  1.  Left main normal giving LAD and codominant circumflex, no angiographic disease  2.  The LAD medium to large caliber giving off a diagonal branch and numerous septal perforators and is coursed to and around the apex, no significant angiographic disease of the LAD with only mild luminal irregularities diffusely less than 2%  3.,  Codominant circumflex with 3 large obtuse marginal branches and an L PLV segment, no angiographic disease of the circumflex distribution  4.  The RCA is a medium caliber vessel with PDA and very small PLV segment distally.  T the RCA is without any angiographic disease, the PDA is small in caliber with diffuse luminal regularities with a 50% lesion is midportion and portion of the vessel less than 1.5 mm in diameter should be treated medically and is not the etiology of her global cardiomyopathy    Conclusions and recommendations  #1 nonischemic cardiomyopathy with normal epicardial coronary anatomy and nonobstructive coronary disease  2.  Maximal goal-directed medical therapy for nonischemic cardiomyopathy with attempt to improve EF over the next 8 to 12 weeks  3.  High LVEDP, continue diuresis as tolerated  4.  Patient be discharged once discharge criteria met with continue goal medical therapy titration as outpatient    Primary prevention goals for CAD    Results communicated the patient after the procedure  today    Joshua Vyas MD, PhD    The following portions of the patient's history were reviewed and updated as appropriate: allergies, current medications, past family history, past medical history, past social history, past surgical history and problem list.      ROS:  14 point review of systems negative except as mentioned above    Current Outpatient Medications:   •  amLODIPine (NORVASC) 2.5 MG tablet, TAKE 1 TABLET BY MOUTH TWICE DAILY, Disp: 60 tablet, Rfl: 5  •  aspirin 81 MG EC tablet, Take 81 mg by mouth Daily., Disp: , Rfl:   •  carvedilol (COREG) 6.25 MG tablet, TAKE 1 TABLET BY MOUTH TWICE DAILY, Disp: 60 tablet, Rfl: 5  •  cholecalciferol (VITAMIN D3) 25 MCG (1000 UT) tablet, Take 1,000 Units by mouth Daily., Disp: , Rfl:   •  Entresto 49-51 MG tablet, TAKE 2 TABLETS BY MOUTH TWICE DAILY, Disp: 120 tablet, Rfl: 2  •  furosemide (LASIX) 40 MG tablet, TAKE 1 TABLET BY MOUTH EVERY DAY, Disp: 30 tablet, Rfl: 5  •  hydrALAZINE (APRESOLINE) 10 MG tablet, Take 1 tablet by mouth As Needed (for systolic b/p greater than 160.)., Disp: 30 tablet, Rfl: 1  •  ibuprofen (ADVIL,MOTRIN) 600 MG tablet, Take 600 mg by mouth Every 6 (Six) Hours As Needed for Mild Pain ., Disp: , Rfl:   •  levonorgestrel (MIRENA) 20 MCG/24HR IUD, TO BE INSERTED ONE TIME BY PRESCRIBER, ROUTE INTRAUTERINE, Disp: 1 each, Rfl: 0    Problem List:  Patient Active Problem List   Diagnosis   • HTN (hypertension)   • Anemia   • Morbid obesity (HCC)   • HLD (hyperlipidemia)   • Tobacco abuse   • Acute systolic CHF (congestive heart failure) (HCC)   • Syncope and collapse   • Mixed hyperlipidemia     Past Medical History:  History reviewed. No pertinent past medical history.  Past Surgical History:  Past Surgical History:   Procedure Laterality Date   • CARDIAC CATHETERIZATION N/A 4/19/2021    Procedure: Left Heart Cath possible PCI, atherectomy, hemodynamic support;  Surgeon: Joshua Vyas MD;  Location: [facility] CATH INVASIVE LOCATION;   Service: Cardiology;  Laterality: N/A;   • WISDOM TOOTH EXTRACTION       Social History:  Social History     Socioeconomic History   • Marital status: Single   Tobacco Use   • Smoking status: Former     Packs/day: 0.50     Types: Cigarettes     Quit date: 2021     Years since quittin.8   • Smokeless tobacco: Never   Vaping Use   • Vaping Use: Never used   Substance and Sexual Activity   • Alcohol use: Not Currently     Alcohol/week: 1.0 standard drink     Types: 1 Glasses of wine per week     Comment: Occ.   • Drug use: Never   • Sexual activity: Defer     Partners: Male     Birth control/protection: I.U.D.     Allergies:  No Known Allergies  Immunizations:  Immunization History   Administered Date(s) Administered   • COVID-19 (PFIZER) PURPLE CAP 2022            In-Office Procedure(s):  No orders to display        ASCVD RIsk Score::  The ASCVD Risk score (Dedrick MCKINNEY, et al., 2019) failed to calculate for the following reasons:    The 2019 ASCVD risk score is only valid for ages 40 to 79    Imaging:         Results for orders placed during the hospital encounter of 22    US Breast Right Limited    Narrative  PROCEDURE: US BREAST RIGHT LIMITED    COMPARISON: [facility], MG, MAMMO DIAGNOSTIC DIGITAL TOMOSYNTHESIS BILATERAL  W CAD, 2022, 10:07.    INDICATIONS: BREAST    TECHNIQUE: Focused right breast ultrasound.    FINDINGS: Please see the diagnostic mammogram report performed the same day for a combined report.      RECOMMENDATION(S):  CLINICAL EVALUATION.    BIRADS:  DIAGNOSTIC CATEGORY 2--BENIGN FINDING    PLEASE NOTE:  THE AMERICAN CANCER SOCIETY NOW RECOMMENDS THAT ALL WOMEN WITH >20% LIFETIME RISK OF  BREAST CANCER UNDERGO ANNUAL SCREENING BREAST MRI AND WOMEN WITH 15-20% SPEAK WITH THEIR DOCTORS  ABOUT ANNUAL SCREENING BREAST MRI.  A NORMAL ULTRASOUND EXAMINATION DOES NOT EXCLUDE THE  POSSIBILITY OF BREAST CANCER.  A CLINICALLY SUSPICIOUS PALPABLE LUMP SHOULD BE  BIOPSIED.        DAIJA SHELTON MD  Electronically Signed and Approved By: DAIJA SHELTON MD on 6/06/2022 at 10:34      Results for orders placed during the hospital encounter of 04/16/21    CT Chest Pulmonary Embolism    Narrative  CT CHEST PULMONARY EMBOLISM-    Date of Exam: 4/16/2021 11:52 AM    Indication: PE suspected, low/intermediate prob, positive D-dimer.    Comparison: None available.    Technique: Serial and axial CT images of the chest were obtained  following the uneventful intravenous administration of 100 cc Isovue-370  contrast. Reconstructions in the coronal and sagittal planes were also  performed.  Automated exposure control and iterative reconstruction  methods were used.    FINDINGS:    Some of the images are degraded by respiratory motion. No pulmonary  embolism is seen. There is mild cardiomegaly particularly involving the  left ventricle. Small left greater than right basilar pleural effusions  are present. Interstitial thickening and hazy groundglass densities in  the lungs are favored to reflect changes of mild interstitial and  alveolar edema, and subsegmental atelectatic changes are present in the  bilateral lower lobes. No dense lung consolidations are identified.    There is reflux of contrast into the hepatic veins and IVC which may  reflect changes of right heart dysfunction. The remainder of included  upper abdominal organs have a normal appearance.    There are no acute or suspicious osseous abnormalities.    Impression  1. No pulmonary embolism.  2. FINDINGS compatible with the appearance of mild interstitial and  alveolar edema with bibasilar atelectasis and small bilateral pleural  effusions.  3. Cardiomegaly particularly involving left ventricle. Reflux of  contrast in the hepatic veins may reflect right heart dysfunction.  Correlate with cardiac history.        Electronically Signed By-Miley Trivedi MD On:4/16/2021 12:30 PM  This report was finalized on [id number] by   Miley Trivedi MD.      Lab Review:   No visits with results within 6 Month(s) from this visit.   Latest known visit with results is:   Lab on 04/05/2022   Component Date Value   • HCG Quantitative 04/05/2022 <0.50      Recent labs reviewed and interpreted for clinical significance and application            Level of Care:           Joshua Vyas MD  02/07/23  .

## 2023-02-13 ENCOUNTER — TELEPHONE (OUTPATIENT)
Dept: CARDIOLOGY | Facility: CLINIC | Age: 38
End: 2023-02-13

## 2023-02-13 NOTE — TELEPHONE ENCOUNTER
Caller: Shannan Galindo    Relationship: Self    Best call back number: [phone]    What orders are you requesting (i.e. lab or imaging): LABWORK    In what timeframe would the patient need to come in: ASAP    Where will you receive your lab/imaging services: [facility]    Additional notes: PT REPORTS SHE IS GETTING LABWORK DONE THROUGH HER PCP BUT THEY HAVE NOT RECEIVED THE ORDER - PT IS GETTING LABS THROUGH KEISHA CASSIDY @ [facility]

## 2023-02-23 RX ORDER — CARVEDILOL 6.25 MG/1
TABLET ORAL
Qty: 60 TABLET | Refills: 11 | Status: SHIPPED | OUTPATIENT
Start: 2023-02-23

## 2023-04-28 RX ORDER — AMLODIPINE BESYLATE 2.5 MG/1
TABLET ORAL
Qty: 60 TABLET | Refills: 5 | Status: SHIPPED | OUTPATIENT
Start: 2023-04-28

## 2023-05-24 RX ORDER — FUROSEMIDE 40 MG/1
TABLET ORAL
Qty: 90 TABLET | Refills: 1 | Status: SHIPPED | OUTPATIENT
Start: 2023-05-24

## 2023-05-30 ENCOUNTER — OFFICE VISIT (OUTPATIENT)
Dept: OBSTETRICS AND GYNECOLOGY | Facility: CLINIC | Age: 38
End: 2023-05-30

## 2023-05-30 VITALS
WEIGHT: 228 LBS | DIASTOLIC BLOOD PRESSURE: 98 MMHG | SYSTOLIC BLOOD PRESSURE: 155 MMHG | HEART RATE: 85 BPM | BODY MASS INDEX: 37.99 KG/M2 | HEIGHT: 65 IN

## 2023-05-30 DIAGNOSIS — Z30.431 ENCOUNTER FOR ROUTINE CHECKING OF INTRAUTERINE CONTRACEPTIVE DEVICE (IUD): ICD-10-CM

## 2023-05-30 DIAGNOSIS — Z01.419 ENCOUNTER FOR GYNECOLOGICAL EXAMINATION WITHOUT ABNORMAL FINDING: Primary | ICD-10-CM

## 2023-05-30 NOTE — PROGRESS NOTES
"Well Woman Visit    CC: Annual well woman exam       HPI:   38 y.o. Contraception or HRT: Contraception:  Mirena IUD  Menses:  occ light  Pain:  None  Incontinence concerns: No  Hx of abnormal pap:  No  Pt has no complaints today.      History: PMHx, Meds, Allergies, PSHx, Social Hx, and POBHx all reviewed and updated.      PHYSICAL EXAM:  /98   Pulse 85   Ht 165.1 cm (65\")   Wt 103 kg (228 lb)   LMP  (Approximate)   BMI 37.94 kg/m²   General- NAD, alert and oriented, appropriate  Psych- Normal mood, good memory  Neck- No masses, no thyroid enlargement  CV- Regular rhythm, no murnurs  Resp- CTA to bases, no wheezes  Abdomen- Soft, non distended, non tender, no masses    Breast left-  Bilaterally symmetrical, no masses, non tender, no nipple discharge  Breast right- Bilaterally symmetrical, no masses, non tender, no nipple discharge    External genitalia- Normal female, no lesions  Urethra/meatus- Normal, no masses, non tender, no prolapse  Bladder- Normal, no masses, non tender, no prolapse  Vagina- Normal, no atrophy, no lesions, no discharge, no prolapse  Cvx- Normal, no lesions, no discharge, No cervical motion tenderness, IUD strings visable 2cm  Uterus- Normal size, shape & consistency.  Non tender, mobile, & no prolapse  Adnexa- No mass, non tender, Difficult to palpate  Anus/Rectum/Perineum- Not performed    Lymphatic- No palpable neck, axillary, or groin nodes  Ext- No edema, no cyanosis    Skin- No lesions, no rashes, no acanthosis nigricans        ASSESSMENT and PLAN:  WWE and Contraception    Diagnoses and all orders for this visit:    1. Encounter for gynecological examination without abnormal finding (Primary)  -     IgP, Aptima HPV    2. Encounter for routine checking of intrauterine contraceptive device (IUD)    happy with mirena and desires to continue. Inserted . Aware of elevated blood pressure. Has htn and takes meds. Will monitor and f/u prn.     Counseling:     Track " menses, RTO IF <q21d, >7d long, or heavy    Domestic violence/abuse screen: negative    Depression screen: no SI    Preventative:   BREAST HEALTH- Monthly self breast exam importance and how to reviewed. MMG and/or MRI (prn) reviewed per society guidelines and her individual history. Mammo/MRI screen: Not medically needed.  CERVICAL CANCER Screening- Reviewed current ASCCP guidelines for screening w and wo cotest HPV, age specific.  Screen: Updated today.  COLON CANCER Screening- Reviewed current medical society guidelines and options.  Colonoscopy screen:  Not medically needed.  SEXUAL HEALTH: Declines STD screening.  VACCINATIONS Recommended: Flu annually, Gardisil/HPV vaccine (up to 46yo).  Importance discussed, risk being unvaccinated reviewed.  Questions answered  Smoking status- NON SMOKER.  Importance of avoiding second hand smoke.  Myriad: does not qualify  Gardasil status: unsure if received      She understands the importance of having any ordered tests to be performed in a timely fashion.  She is encouraged to review her results online and/or contact or office if she has questions.     Follow Up:  Return if symptoms worsen or fail to improve.      Greta Garcia, APRN  05/30/2023

## 2023-06-06 LAB
CYTOLOGIST CVX/VAG CYTO: ABNORMAL
CYTOLOGY CVX/VAG DOC CYTO: ABNORMAL
CYTOLOGY CVX/VAG DOC THIN PREP: ABNORMAL
DX ICD CODE: ABNORMAL
DX ICD CODE: ABNORMAL
HIV 1 & 2 AB SER-IMP: ABNORMAL
HPV I/H RISK 4 DNA CVX QL PROBE+SIG AMP: NEGATIVE
OTHER STN SPEC: ABNORMAL
PATHOLOGIST CVX/VAG CYTO: ABNORMAL
STAT OF ADQ CVX/VAG CYTO-IMP: ABNORMAL

## 2023-06-07 ENCOUNTER — TELEPHONE (OUTPATIENT)
Dept: OBSTETRICS AND GYNECOLOGY | Facility: CLINIC | Age: 38
End: 2023-06-07
Payer: MEDICAID

## 2023-06-07 NOTE — TELEPHONE ENCOUNTER
----- Message from JASE Robertson sent at 6/6/2023  3:14 PM EDT -----  Please discuss. Co-test due in three years. Thanks

## 2023-06-12 ENCOUNTER — TELEPHONE (OUTPATIENT)
Dept: CARDIOLOGY | Facility: CLINIC | Age: 38
End: 2023-06-12

## 2023-06-12 NOTE — TELEPHONE ENCOUNTER
Spoke with patient. She will call when its closer to insurance switching over. Will send rx of entresto then to see how its covered.

## 2023-06-12 NOTE — TELEPHONE ENCOUNTER
Caller: Shannan Galindo    Relationship: Self    Best call back number: [phone]    Who are you requesting to speak with (clinical staff, provider,  specific staff member): DR GILLIS/ CLINICAL    What was the call regarding: PT STATES SHE WAS ASKED BY OFFICE TO REACH OUT IF THERE ARE CHANGES WITH HER INSURANCE IN REGARDS TO HER ENTRESTO PRESCRIPTION - PT INSURANCE HAS CHANGED AND PT WANTING TO SPEAK WITH SOMEONE ABOUT OPTIONS    Is it okay if the provider responds through MyChart: CALL BACK PLEASE

## 2023-07-28 ENCOUNTER — TELEPHONE (OUTPATIENT)
Dept: CARDIOLOGY | Facility: CLINIC | Age: 38
End: 2023-07-28
Payer: MEDICAID

## 2023-07-28 NOTE — TELEPHONE ENCOUNTER
Caller: Shannan Galindo    Relationship: Self    Best call back number: [phone]    What is the best time to reach you: ANYTIME    Who are you requesting to speak with (clinical staff, provider,  specific staff member): CLINICAL        What was the call regarding: DO YOU HAVE ANY ENTRESTO SAMPLES? CAN SHE  IN [place] OR [place]? PLEASE CALL PT     Is it okay if the provider responds through [place]: YES

## 2023-10-02 ENCOUNTER — TELEPHONE (OUTPATIENT)
Dept: CARDIOLOGY | Facility: CLINIC | Age: 38
End: 2023-10-02
Payer: MEDICAID

## 2023-10-02 NOTE — TELEPHONE ENCOUNTER
Caller Name: Shannan Galindo  Relationship: Self  Best Contact Number: [phone]    Patient is requesting samples of ENTRESTO 97-103MG (USUALLY TAKES 49-51MG SAMPLE WISE SO SHE DOUBLES UP)  How many days of medication do you have left? 2 WEEKS    Additional Information: PATIENT IS NEEDING MORE SAMPLES, SHE LIVES LOSER TO THE [place] OFFICE, IS THERE ANY WAY SHE CAN PICK THEM UP THERE?

## 2023-10-02 NOTE — TELEPHONE ENCOUNTER
LVM TO LET PT KNOW THAT HER ENTRESTO SAMPLES ARE READY FOR PICKUP AND THEY WE WILL HAVE THEM AT [facility] ON FRIDAY, 10/6/23.

## 2023-10-16 RX ORDER — AMLODIPINE BESYLATE 2.5 MG/1
TABLET ORAL
Qty: 60 TABLET | Refills: 5 | Status: SHIPPED | OUTPATIENT
Start: 2023-10-16

## 2023-10-26 ENCOUNTER — TELEPHONE (OUTPATIENT)
Dept: CARDIOLOGY | Facility: CLINIC | Age: 38
End: 2023-10-26

## 2023-10-26 NOTE — TELEPHONE ENCOUNTER
Caller Name: AYESHA  Relationship: SELF  Best Contact Number: [phone]    Patient is requesting samples of ENTRESTO  How many days of medication do you have left? 2 WEEKS    Additional Information: PATIENT HAS PLENY OF MEDICATION LEFT, WOULD LIKE TO  THE SAMPLES IN [place].

## 2023-10-27 ENCOUNTER — TELEPHONE (OUTPATIENT)
Dept: CARDIOLOGY | Facility: CLINIC | Age: 38
End: 2023-10-27

## 2023-10-27 NOTE — TELEPHONE ENCOUNTER
Caller: Shannan Galindo    Relationship: Self    Best call back number: [phone]    What is the best time to reach you: ANY    Who are you requesting to speak with (clinical staff, provider,  specific staff member): CLINICAL        What was the call regarding: PATIENT STATED THAT SHE IS HAVING A HARD TIME TAKING OFF WORK TO COME GET ENTRESTO SAMPLES AND WANTS TO KNOW IF DR. GILLIS COULD PRESCRIBE A DIFFERENT MEDICATION THAT HER INSURANCE WOULD COVER. PLEASE CONTACT PATIENT TO ADVISE.     Is it okay if the provider responds through [facility]: PLEASE CALL.

## 2023-11-16 ENCOUNTER — TELEPHONE (OUTPATIENT)
Dept: CARDIOLOGY | Facility: CLINIC | Age: 38
End: 2023-11-16

## 2023-11-16 NOTE — TELEPHONE ENCOUNTER
Caller: AYESHA    Relationship:SELF    Callback number: [phone]   Is it ok to leave a message: [] Yes [x] No    Requested medication for samples: ENTRESTO  MG    How much medication does the patient currently have left: WEEK OF MEDICATION    Who will be picking up the samples: AYESHA MARTA OR SALVATORE MELENDEZ(GRANDMOTHER)    Do you need information about patient financial assistance for this medication: [] Yes [x] No    Additional details provided:  PATIENT STATED THAT SHE WOULD LIKE TO PICK THE ENTRESTO SAMPLES UP AT THE [facility] LOCATION. PLEASE CONTACT PATIENT TO ADVISE.

## 2023-11-20 RX ORDER — FUROSEMIDE 40 MG/1
TABLET ORAL
Qty: 90 TABLET | Refills: 1 | Status: SHIPPED | OUTPATIENT
Start: 2023-11-20

## 2023-12-18 ENCOUNTER — TELEPHONE (OUTPATIENT)
Dept: CARDIOLOGY | Facility: CLINIC | Age: 38
End: 2023-12-18

## 2023-12-18 NOTE — TELEPHONE ENCOUNTER
Caller Name: Shannan Galindo      Relationship: Self      Best Contact Number: [phone]      Patient is requesting samples of ENTRESTO (PATIENT STATES A DIFFERENT DOSAGE THAN WHAT IS ON MED LIST)      How many days of medication do you have left? A LITTLE OVER A WEEK        Additional Information: PATIENT WOULD LIKE TO  THE SAMPLES AT THE [place] OFFICE. PLEASE CALL PATIENT WHEN SAMPLES ARE READY.

## 2023-12-19 NOTE — TELEPHONE ENCOUNTER
SPOKE WITH PATIENT. THAT DOSAGE IS CORRECT. I HAVE THEM READY FOR HER TO PICKUP IN [place] ON 12/22/23.

## 2024-01-22 ENCOUNTER — TELEPHONE (OUTPATIENT)
Dept: CARDIOLOGY | Facility: CLINIC | Age: 39
End: 2024-01-22

## 2024-01-22 NOTE — TELEPHONE ENCOUNTER
Pt states she takes 49-51mg if 97-103mg are not available.  She doubles up on 49-51mg.  Pt will  at the [place] office.

## 2024-01-22 NOTE — TELEPHONE ENCOUNTER
Caller: AYESHA SIM    Relationship:SELF     Callback number: [phone]   Is it ok to leave a message: [x] Yes [] No    Requested medication for samples: ENTRESTO  MG, BUT WILLING TO TAKE 49-51 MG    How much medication does the patient currently have left: 7-8 DAYS    Who will be picking up the samples: PATIENT WANTS TO  IN [place] LOCATION POSSIBLY THIS FRIDAY    Do you need information about patient financial assistance for this medication: [] Yes [x] No    Additional details provided:   PLEASE REACH OUT TO PATIENT FOR AVAILABILITY.

## 2024-02-05 ENCOUNTER — TELEPHONE (OUTPATIENT)
Dept: CARDIOLOGY | Facility: CLINIC | Age: 39
End: 2024-02-05

## 2024-02-05 NOTE — TELEPHONE ENCOUNTER
Caller: Shannan Galindo    Relationship to patient: Self    Best call back number: [phone] (home)      Type of visit: 1 YR FU     If rescheduling, when is the original appointment: 2.6.24     Additional notes:PT CALLED IN NEEDING TO RESCHEDULE APPT. RESCHEDULED PT FOR 4.9.24 DUE TO NEXT MELVI. PLEASE ADVISE APPT DATE IS OKAY SINCE OUTSIDE OF 3 WEEK TIMEFRAME TO R/S WITHIN, OR CALL PT BACK TO OFFER SOONER APPT IF POSSIBLE/NEEDED.

## 2024-02-12 RX ORDER — CARVEDILOL 6.25 MG/1
TABLET ORAL
Qty: 60 TABLET | Refills: 11 | Status: SHIPPED | OUTPATIENT
Start: 2024-02-12

## 2024-03-08 ENCOUNTER — TELEPHONE (OUTPATIENT)
Dept: CARDIOLOGY | Facility: CLINIC | Age: 39
End: 2024-03-08

## 2024-03-08 NOTE — TELEPHONE ENCOUNTER
Caller Name: Shannan Galindo      Relationship: Self      Best Contact Number: [phone]      Patient is requesting samples of: ENTRESTO  MG      How many days of medication do you have left? 0        Additional Information: SHADY

## 2024-03-15 ENCOUNTER — TELEPHONE (OUTPATIENT)
Dept: CARDIOLOGY | Facility: CLINIC | Age: 39
End: 2024-03-15

## 2024-03-15 NOTE — TELEPHONE ENCOUNTER
Caller: Shannan Galindo    Relationship to patient: Self    Best call back number:  [phone]    Patient is needing: PATIENT REQUESTING ALTERNATIVE TO ENTRESTO.

## 2024-04-10 RX ORDER — AMLODIPINE BESYLATE 2.5 MG/1
TABLET ORAL
Qty: 60 TABLET | Refills: 5 | Status: SHIPPED | OUTPATIENT
Start: 2024-04-10

## 2024-04-22 ENCOUNTER — TELEPHONE (OUTPATIENT)
Dept: CARDIOLOGY | Facility: CLINIC | Age: 39
End: 2024-04-22

## 2024-04-22 NOTE — TELEPHONE ENCOUNTER
Caller: BETO LOZANO    Relationship:SELF    Callback number: [phone]   Is it ok to leave a message: [x] Yes [] No    Requested medication for samples: ENTRESTO, 4 BOTTLES, SHE IS ON HIGHER DOSAGE    How much medication does the patient currently have left: 1 WEEK    Who will be picking up the samples: [facility]    Do you need information about patient financial assistance for this medication: [] Yes [x] No    Additional details provided: PATIENT WOULD LIKE TO  IN [place] ON FRIDAY IF POSSIBLE.

## 2024-05-16 RX ORDER — FUROSEMIDE 40 MG/1
TABLET ORAL
Qty: 90 TABLET | Refills: 1 | OUTPATIENT
Start: 2024-05-16

## 2024-05-20 ENCOUNTER — TELEPHONE (OUTPATIENT)
Dept: CARDIOLOGY | Facility: CLINIC | Age: 39
End: 2024-05-20

## 2024-05-20 NOTE — TELEPHONE ENCOUNTER
Caller: Shannan Galindo    Relationship to patient: Self    Best call back number: [phone]    Patient is needing: PATIENT  4 BOTTLES OF ENTRESTO SAMPLES IN [place]. PATIENT CAN  FRIDAY.

## 2024-05-21 RX ORDER — FUROSEMIDE 40 MG/1
TABLET ORAL
Qty: 90 TABLET | Refills: 1 | OUTPATIENT
Start: 2024-05-21

## 2024-05-24 ENCOUNTER — TELEPHONE (OUTPATIENT)
Dept: CARDIOLOGY | Facility: CLINIC | Age: 39
End: 2024-05-24

## 2024-05-24 RX ORDER — FUROSEMIDE 40 MG/1
TABLET ORAL
Qty: 90 TABLET | Refills: 0 | Status: SHIPPED | OUTPATIENT
Start: 2024-05-24

## 2024-05-24 NOTE — TELEPHONE ENCOUNTER
Please advise what to give patient we have Entresto 24-26 mg and 49-51mg, Per patient chart shows  MG.

## 2024-05-24 NOTE — TELEPHONE ENCOUNTER
Caller: Shannan Galindo    Relationship to patient: Self    Best call back number:  [phone]    Patient is needing: PATIENT PICKED UP SAMPLES OF ENTRESTO TODAY. PATIENT WAS TOLD SHE COULDN'T GET MORE SAMPLES UNTIL SHE WAS SEEN. PATIENTS INSURANCE WAS CANCELED AT WORK AND SHE'S UNSURE WHEN THAT WOULD GO BACK IN EFFECT.

## 2024-06-24 ENCOUNTER — TELEPHONE (OUTPATIENT)
Dept: CARDIOLOGY | Facility: CLINIC | Age: 39
End: 2024-06-24

## 2024-06-24 NOTE — TELEPHONE ENCOUNTER
Caller Name: Shannan Galindo      Relationship: Self      Best Contact Number: [phone]      Patient is requesting samples of ENTRESTO      How many days of medication do you have left? 4 DAYS LEFT      Additional Information:  PT IS REQUESTING TWO BOTTLES OF SAMPLES TO HOLD HER OVER UNTIL NEXT FU APPT. SHE IS ALSO WANTING TO PICK THESE UP AT [place] LOCATION. CALL AND RELAY WHEN PT CAN  SAMPLES.

## 2024-07-11 ENCOUNTER — OFFICE VISIT (OUTPATIENT)
Dept: CARDIOLOGY | Facility: CLINIC | Age: 39
End: 2024-07-11
Payer: MEDICAID

## 2024-07-11 VITALS
HEART RATE: 105 BPM | WEIGHT: 224 LBS | BODY MASS INDEX: 37.32 KG/M2 | OXYGEN SATURATION: 98 % | DIASTOLIC BLOOD PRESSURE: 115 MMHG | SYSTOLIC BLOOD PRESSURE: 155 MMHG | RESPIRATION RATE: 18 BRPM | HEIGHT: 65 IN

## 2024-07-11 DIAGNOSIS — I10 HYPERTENSION, UNSPECIFIED TYPE: ICD-10-CM

## 2024-07-11 DIAGNOSIS — I42.8 NICM (NONISCHEMIC CARDIOMYOPATHY): ICD-10-CM

## 2024-07-11 DIAGNOSIS — Z09 FOLLOW-UP EXAM: Primary | ICD-10-CM

## 2024-07-11 DIAGNOSIS — E78.2 MIXED HYPERLIPIDEMIA: Chronic | ICD-10-CM

## 2024-07-30 NOTE — PROGRESS NOTES
Cardiology Office Follow Up Visit      Primary Care Provider:  Samantha Pinedo APRN    Reason for f/u:     Follow up, NICM, HTN, HLD      Subjective     CC:    Follow up    Hypertension  Pertinent negatives include no chest pain, headaches, malaise/fatigue, orthopnea, palpitations, PND or shortness of breath.   Follow-upCurrent symptoms include no chest pain, no dizziness, no palpitations, no shortness of breath, no headaches, no orthopnea, no PND, no syncope, no sputum production and no cough.          Shannan Galindo is a 39 y.o. female who is a patient of Dr. Vyas. Pmh includes nonischemic cardiomyopathy EF of 15 to 20% now recovered to LVEF 60% per ECHO in 2021, obesity, HTN, HLD, former tobacco use.    She was hospitalized with severe hypertension which she was placed on goal-directed medical therapy.  Left heart catheterization revealed no  significant obstructive coronary disease and high filling pressures.  She has a global cardiomyopathy in nature.      She presents today for follow up. She reports she is anxious currently. Her b/p / HR logs reveal home heart rates in the 70s and blood pressures generally controlled in the 120/80. She denies chest pain or shortness of breath. No edema. She seems stable and tolerating her GDMT.         ASSESSMENT/PLAN:      Diagnoses and all orders for this visit:    1. Follow-up exam (Primary)    2. NICM (nonischemic cardiomyopathy)  -     Basic Metabolic Panel; Future  -     Magnesium; Future    3. Mixed hyperlipidemia    4. Hypertension, unspecified type      Chronic systolic heart failure / volume overload / NICM  - LVEF initially 18% at [facility] medical therapy was pursued, repeat most recently echo August 2021 revealed fully recovered EF 61% on heart failure therapies.  -Continue Coreg 6.25 twice daily, increase to 12.5 in am  Remain off Aldactone    Change Entresto 97/103, 1 tab twice daily  Off hydralazine, as needed only for anything greater than 140  systolic  Continue Lasix 40 daily  Goal blood pressure simply less than 135 systolic,     Essential hypertension uncontrolled, whitecoat hypertension  Amlodipine 2.5 daily, increase to 5 if not controlled less than 135 systolic  Continue other present medicines including Entresto and Coreg, diuretic  As needed hydralazine  Other medicines as above with history of nonischemic cardiomyopathy    Obesity, diet and exercise per AHA guidelines     Likely COOPER, sleep study recommended        MEDICAL DECISION MAKING:  Continue GDMT. Patient has recovery of LV systolic function. She is doing well overall. She has whitecoat hypertension and is anxious with mildly elevated heart rate today. We discussed increasing coreg to 12.5mg in am and 6.25 in pm. Continue entresto, loop diuretics. Also on amlodipine for blood pressure control and as needed hydralazine. No chest pain, no signs of decompensated heart failure. Will check BMP and Mg level. We will see her back in 1 year or sooner if new issues arise. We did discuss if she has HF symptoms, change in activity to be seen sooner.           Past Medical History:   Diagnosis Date    Anemia     Hypertension        Past Surgical History:   Procedure Laterality Date    CARDIAC CATHETERIZATION N/A 04/19/2021    Procedure: Left Heart Cath possible PCI, atherectomy, hemodynamic support;  Surgeon: Joshua Vyas MD;  [facility]: [facility] CATH INVASIVE LOCATION;  Service: Cardiology;  Laterality: N/A;    INTRAUTERINE DEVICE INSERTION      WISDOM TOOTH EXTRACTION           Current Outpatient Medications:     amLODIPine (NORVASC) 2.5 MG tablet, TAKE 1 TABLET BY MOUTH TWICE DAILY, Disp: 60 tablet, Rfl: 5    aspirin 81 MG EC tablet, Take 1 tablet by mouth Daily., Disp: , Rfl:     carvedilol (COREG) 6.25 MG tablet, TAKE 1 TABLET BY MOUTH TWICE DAILY, Disp: 60 tablet, Rfl: 11    cholecalciferol (VITAMIN D3) 25 MCG (1000 UT) tablet, Take 1 tablet by mouth Daily., Disp: , Rfl:     furosemide  (LASIX) 40 MG tablet, TAKE 1 TABLET BY MOUTH EVERY DAY, Disp: 90 tablet, Rfl: 0    hydrALAZINE (APRESOLINE) 25 MG tablet, Take 1 tablet by mouth 3 (Three) Times a Day As Needed (for systolic b/p greater than 160.)., Disp: 90 tablet, Rfl: 3    ibuprofen (ADVIL,MOTRIN) 600 MG tablet, Take 1 tablet by mouth Every 6 (Six) Hours As Needed for Mild Pain., Disp: , Rfl:     levonorgestrel (MIRENA) 20 MCG/24HR IUD, TO BE INSERTED ONE TIME BY PRESCRIBER, ROUTE INTRAUTERINE, Disp: 1 each, Rfl: 0    sacubitril-valsartan (Entresto)  MG tablet, Take 1 tablet by mouth 2 (Two) Times a Day., Disp: 60 tablet, Rfl: 11    Social History     Socioeconomic History    Marital status: Single   Tobacco Use    Smoking status: Former     Current packs/day: 0.00     Types: Cigarettes     Quit date: 4/12/2021     Years since quitting: 3.3     Passive exposure: Past    Smokeless tobacco: Never   Vaping Use    Vaping status: Never Used   Substance and Sexual Activity    Alcohol use: Not Currently     Alcohol/week: 1.0 standard drink of alcohol     Types: 1 Glasses of wine per week     Comment: Occ.    Drug use: Never    Sexual activity: Defer     Partners: Male     Birth control/protection: I.U.D.       Family History   Problem Relation Age of Onset    Hypertension Mother     Heart disease Maternal Grandmother     Hypertension Maternal Grandmother     Breast cancer Neg Hx     Ovarian cancer Neg Hx     Uterine cancer Neg Hx     Colon cancer Neg Hx     Deep vein thrombosis Neg Hx     Pulmonary embolism Neg Hx     Melanoma Neg Hx        The following portions of the patient's history were reviewed and updated as appropriate: allergies, current medications, past family history, past medical history, past social history, past surgical history and problem list.    Review of Systems   Constitutional: Negative for chills, diaphoresis and malaise/fatigue.   Cardiovascular:  Negative for chest pain, dyspnea on exertion, irregular heartbeat, leg  "swelling, near-syncope, orthopnea, palpitations, paroxysmal nocturnal dyspnea and syncope.   Respiratory:  Negative for cough, shortness of breath, sleep disturbances due to breathing and sputum production.    Gastrointestinal:  Negative for change in bowel habit.   Genitourinary:  Negative for urgency.   Neurological:  Negative for dizziness and headaches.   Psychiatric/Behavioral:  Negative for altered mental status.        Pertinent items are noted in HPI, all other systems reviewed and negative    BP (!) 155/115 (BP Location: Right arm, Patient Position: Sitting, Cuff Size: Large Adult) Comment: white coat syndrome 124/83  Pulse 105   Resp 18   Ht 165.1 cm (65\")   Wt 102 kg (224 lb)   SpO2 98%   BMI 37.28 kg/m² .  Objective     Constitutional:       Appearance: Not in distress.   Neck:      Vascular: JVD normal.   Pulmonary:      Effort: Pulmonary effort is normal.      Breath sounds: Normal breath sounds.   Cardiovascular:      Normal rate. Regular rhythm.   Pulses:     Intact distal pulses.   Edema:     Peripheral edema absent.   Abdominal:      General: Bowel sounds are normal.      Palpations: Abdomen is soft.   Musculoskeletal: Normal range of motion. Skin:     General: Skin is warm and dry.   Neurological:      General: No focal deficit present.      Mental Status: Oriented to person, place and time.             ECG 12 Lead    Date/Time: 7/30/2024 10:07 AM  Performed by: Coral Garcia APRN    Authorized by: Coral Garcia APRN  Comparison: not compared with previous ECG   Previous ECG: no previous ECG available  Rhythm: sinus tachycardia  Rate: tachycardic  BPM: 103          EKG ordered by and reviewed by me in office             Shannan Galindo  reports that she quit smoking about 3 years ago. Her smoking use included cigarettes. She has been exposed to tobacco smoke. She has never used smokeless tobacco. I have educated her on the risk of diseases from using tobacco products such as cancer, COPD, " and heart disease.

## 2024-08-13 ENCOUNTER — TELEPHONE (OUTPATIENT)
Dept: CARDIOLOGY | Facility: CLINIC | Age: 39
End: 2024-08-13
Payer: MEDICAID

## 2024-08-13 NOTE — TELEPHONE ENCOUNTER
Caller: AYESHA LOZANO    Relationship:PATIENT    Callback number: [phone]    Is it ok to leave a message: [x] Yes [] No    Requested medication for samples: ENTRESTO  OR 49-51    How much medication does the patient currently have left: 1 WEEK    Who will be picking up the samples: PATIENT    Additional details provided: PT STATES SHE WAS TOLD TO SAY SHE NEEDS FOUR BOTTLES AND THAT SHE WOULD LIKE TO PICK IT UP ON ONE OF THE DAYS A PROVIDER IS IN [place] - PT STATES SHE HAS A WEEK LEFT - PT STATES SHE USUALLY IS GIVEN THE 49-51 DOSAGE AND DOUBLES IT AS HERS IS  DOSAGE -

## 2024-08-19 RX ORDER — FUROSEMIDE 40 MG/1
TABLET ORAL
Qty: 90 TABLET | Refills: 3 | Status: SHIPPED | OUTPATIENT
Start: 2024-08-19

## 2024-09-09 ENCOUNTER — TELEPHONE (OUTPATIENT)
Dept: CARDIOLOGY | Facility: CLINIC | Age: 39
End: 2024-09-09
Payer: MEDICAID

## 2024-09-09 NOTE — TELEPHONE ENCOUNTER
Caller: Shannan Galindo    Relationship to patient: Self    Best call back number:  [phone]    Patient is needing: PATIENT REQUESTING 4 BOTTLES OF ENTRESTO SAMPLES. PATIENT REQUESTING TO  IN [place].

## 2024-10-07 ENCOUNTER — TELEPHONE (OUTPATIENT)
Dept: CARDIOLOGY | Facility: CLINIC | Age: 39
End: 2024-10-07

## 2024-10-07 NOTE — TELEPHONE ENCOUNTER
Patient aware that we will have 5 bottles ready for pickup for her of entresto 49-51mg on 10/9/24 or 10/10/24 in [place]

## 2024-10-07 NOTE — TELEPHONE ENCOUNTER
Caller Name: Shannan Galindo  Relationship: Self  Best Contact Number: [phone]    Patient is requesting samples of ENTRESTO  MG   How many days of medication do you have left? ABOUT A WEEK    Additional Information: PT WOULD LIKE TO PICKUP SAMPLES FROM [place] OFFICE AND SAID THAT SHE WOULD NEED 5 BOTTLES BECAUSE SHE IS GOING ON VACATION

## 2024-10-16 RX ORDER — AMLODIPINE BESYLATE 2.5 MG/1
TABLET ORAL
Qty: 60 TABLET | Refills: 5 | Status: SHIPPED | OUTPATIENT
Start: 2024-10-16

## 2024-11-08 ENCOUNTER — TELEPHONE (OUTPATIENT)
Dept: CARDIOLOGY | Facility: CLINIC | Age: 39
End: 2024-11-08
Payer: COMMERCIAL

## 2024-11-08 NOTE — TELEPHONE ENCOUNTER
Caller: AYESHA    Relationship:SELF    Callback number: [phone]   Is it ok to leave a message: [x] Yes [] No    Requested medication for samples: ENTRESTO (4 BOTTLES FOR  IN [place])    How much medication does the patient currently have left: 1 WEEK    Who will be picking up the samples: PATIENT    Do you need information about patient financial assistance for this medication: [] Yes [] No    Additional details provided:

## 2024-11-08 NOTE — TELEPHONE ENCOUNTER
PATIENT AWARE THAT WE WILL HAVE 4 BOTTLES OF ENTRESTO 49/51MG AVAILABLE FOR PICKUP AT [place] ON MONDAY, 11/11/24

## 2024-12-02 ENCOUNTER — TELEPHONE (OUTPATIENT)
Dept: CARDIOLOGY | Facility: CLINIC | Age: 39
End: 2024-12-02
Payer: COMMERCIAL

## 2024-12-02 NOTE — TELEPHONE ENCOUNTER
Caller: AYESHA    Relationship: PATIENT    Callback number: [phone]   Is it ok to leave a message: [x] Yes [] No    Requested medication for samples: ENTRESTO    How much medication does the patient currently have left: A LITTLE OVER A WEEK    Who will be picking up the samples: PATIENT    Do you need information about patient financial assistance for this medication: [] Yes [] No    Additional details provided: PATIENT NEEDS AT LEAST 4 BOTTLES FOR  IN [place]

## 2024-12-23 ENCOUNTER — TELEPHONE (OUTPATIENT)
Dept: CARDIOLOGY | Facility: CLINIC | Age: 39
End: 2024-12-23
Payer: COMMERCIAL

## 2024-12-23 NOTE — TELEPHONE ENCOUNTER
Caller Name: Shannan Galindo      Relationship: Self      Best Contact Number: [phone]    Patient is requesting samples of: [place]  IN [place] IF POSSIBLE.

## 2025-01-16 ENCOUNTER — TELEPHONE (OUTPATIENT)
Dept: CARDIOLOGY | Facility: CLINIC | Age: 40
End: 2025-01-16
Payer: COMMERCIAL

## 2025-02-10 ENCOUNTER — TELEPHONE (OUTPATIENT)
Dept: CARDIOLOGY | Facility: CLINIC | Age: 40
End: 2025-02-10
Payer: COMMERCIAL

## 2025-02-10 RX ORDER — CARVEDILOL 6.25 MG/1
TABLET ORAL
Qty: 60 TABLET | Refills: 11 | Status: SHIPPED | OUTPATIENT
Start: 2025-02-10

## 2025-02-10 NOTE — TELEPHONE ENCOUNTER
Patient informed samples will be taken to [facility] for    4 bottles of entresto 49/51 mg  Patient doubles this dose per SINA VALENZUELA

## 2025-02-10 NOTE — TELEPHONE ENCOUNTER
Caller Name: Shannan Galindo      Relationship: Self      Best Contact Number: [phone]      Patient is requesting samples of: ENTRESTO       How many days of medication do you have left? 1 WEEK    PT REQUEST 4 BOTTLES IN [place].

## 2025-03-10 ENCOUNTER — TELEPHONE (OUTPATIENT)
Dept: CARDIOLOGY | Facility: CLINIC | Age: 40
End: 2025-03-10
Payer: COMMERCIAL

## 2025-03-10 NOTE — TELEPHONE ENCOUNTER
Caller: AYESHA MARTA    Relationship:SELF    Callback number: [phone]   Is it ok to leave a message: [x] Yes [] No    Requested medication for samples: sacubitril-valsartan (Entresto)  MG tablet     How much medication does the patient currently have left: HAS ABOUT A WEEK LEFT    Who will be picking up the samples: PT WILL  IN [place] IF POSSIBLE    Do you need information about patient financial assistance for this medication: [] Yes [x] No    Additional details provided: PT WAS TOLD TO REQUEST 4 BOTTLES AT A TIME. WOULD LIKE TO  IN [place]. PLEASE CALL IF NONE AVAILABLE.

## 2025-03-11 NOTE — TELEPHONE ENCOUNTER
Patient assistance paperwork is put with 6 bottles of samples for the patient. [facility]ole has them to take out to [facility] for the patient.

## 2025-04-07 RX ORDER — AMLODIPINE BESYLATE 2.5 MG/1
2.5 TABLET ORAL EVERY 12 HOURS SCHEDULED
Qty: 60 TABLET | Refills: 5 | Status: SHIPPED | OUTPATIENT
Start: 2025-04-07

## 2025-04-24 RX ORDER — SACUBITRIL AND VALSARTAN 97; 103 MG/1; MG/1
1 TABLET, FILM COATED ORAL 2 TIMES DAILY
Qty: 60 TABLET | Refills: 11 | Status: SHIPPED | OUTPATIENT
Start: 2025-04-24

## 2025-04-24 NOTE — TELEPHONE ENCOUNTER
Caller: Shannan Galindo    Relationship: Self    Best call back number:  [phone]    Requested Prescriptions:   Requested Prescriptions     Pending Prescriptions Disp Refills    sacubitril-valsartan (Entresto)  MG tablet 60 tablet 11     Sig: Take 1 tablet by mouth 2 (Two) Times a Day.        Pharmacy where request should be sent: [address] [phone] [facility] [phone]      Last office visit with prescribing clinician: Visit date not found   Last telemedicine visit with prescribing clinician: Visit date not found   Next office visit with prescribing clinician: Visit date not found     Additional details provided by patient:      Does the patient have less than a 3 day supply:  [x] Yes  [] No    Would you like a call back once the refill request has been completed: [] Yes [] No    If the office needs to give you a call back, can they leave a voicemail: [] Yes [] No    Vick Michaels Rep   04/24/25 15:06 EDT

## 2025-06-24 ENCOUNTER — TELEPHONE (OUTPATIENT)
Dept: CARDIOLOGY | Facility: CLINIC | Age: 40
End: 2025-06-24

## 2025-06-24 NOTE — TELEPHONE ENCOUNTER
Caller: Shannan Galindo    Relationship: Self    Best call back number: [phone]    What was the call regarding: PT IS CALLING TO SEE IF WE ACCEPT HER INSURANCE, ITS ON OUR LIST THAT WE DONT ACCEPT BECAUSE ITS [place] MEDICA[place]- [place] MEDICAID AND PT WANTS TO BE SEEN IN [place]. SHE SAID SHE CALLED MEDICAID AND THEY TOLD HER THEY WOULD PAY FOR IT SINCE [place] IS A [place]. PT WOULD LIKE A CALL BACK

## 2025-08-07 RX ORDER — FUROSEMIDE 40 MG/1
40 TABLET ORAL DAILY
Qty: 30 TABLET | Refills: 0 | Status: SHIPPED | OUTPATIENT
Start: 2025-08-07

## (undated) DEVICE — ST ACC MICROPUNCTURE STFF/CANN PLAT/TP 4F 21G 40CM

## (undated) DEVICE — CATH DIAG IMPULSE FL3 5F 100CM

## (undated) DEVICE — BND COMPR ZEPHYR VASC LG

## (undated) DEVICE — DRAPE, RADIAL, STERILE: Brand: MEDLINE

## (undated) DEVICE — GW EMR FIX EXCHG J STD .035 3MM 260CM

## (undated) DEVICE — GLIDESHEATH SLENDER STAINLESS STEEL KIT: Brand: GLIDESHEATH SLENDER

## (undated) DEVICE — PINNACLE INTRODUCER SHEATH: Brand: PINNACLE

## (undated) DEVICE — PK TRY HEART CATH 50

## (undated) DEVICE — CATH MPAC STP 6F: Brand: SUPER TORQUE